# Patient Record
Sex: MALE | Race: WHITE | NOT HISPANIC OR LATINO | Employment: FULL TIME | URBAN - METROPOLITAN AREA
[De-identification: names, ages, dates, MRNs, and addresses within clinical notes are randomized per-mention and may not be internally consistent; named-entity substitution may affect disease eponyms.]

---

## 2017-03-15 ENCOUNTER — OFFICE VISIT (OUTPATIENT)
Dept: HEMATOLOGY/ONCOLOGY | Facility: CLINIC | Age: 37
End: 2017-03-15
Payer: COMMERCIAL

## 2017-03-15 ENCOUNTER — LAB VISIT (OUTPATIENT)
Dept: LAB | Facility: HOSPITAL | Age: 37
End: 2017-03-15
Attending: INTERNAL MEDICINE
Payer: COMMERCIAL

## 2017-03-15 VITALS
DIASTOLIC BLOOD PRESSURE: 75 MMHG | HEART RATE: 68 BPM | WEIGHT: 183.19 LBS | BODY MASS INDEX: 28.75 KG/M2 | SYSTOLIC BLOOD PRESSURE: 116 MMHG | HEIGHT: 67 IN

## 2017-03-15 DIAGNOSIS — D61.9 APLASTIC ANEMIA: ICD-10-CM

## 2017-03-15 DIAGNOSIS — Z86.2 HISTORY OF APLASTIC ANEMIA: Primary | ICD-10-CM

## 2017-03-15 LAB
BASOPHILS # BLD AUTO: 0 K/UL
BASOPHILS NFR BLD: 0 %
DIFFERENTIAL METHOD: ABNORMAL
EOSINOPHIL # BLD AUTO: 0.1 K/UL
EOSINOPHIL NFR BLD: 2.2 %
ERYTHROCYTE [DISTWIDTH] IN BLOOD BY AUTOMATED COUNT: 12.3 %
HCT VFR BLD AUTO: 44.3 %
HGB BLD-MCNC: 15.2 G/DL
LYMPHOCYTES # BLD AUTO: 1.9 K/UL
LYMPHOCYTES NFR BLD: 39.4 %
MCH RBC QN AUTO: 33.1 PG
MCHC RBC AUTO-ENTMCNC: 34.3 %
MCV RBC AUTO: 97 FL
MONOCYTES # BLD AUTO: 0.5 K/UL
MONOCYTES NFR BLD: 9.1 %
NEUTROPHILS # BLD AUTO: 2.4 K/UL
NEUTROPHILS NFR BLD: 49.1 %
PLATELET # BLD AUTO: 174 K/UL
PMV BLD AUTO: 9.7 FL
RBC # BLD AUTO: 4.59 M/UL
WBC # BLD AUTO: 4.92 K/UL

## 2017-03-15 PROCEDURE — 99213 OFFICE O/P EST LOW 20 MIN: CPT | Mod: S$GLB,,, | Performed by: INTERNAL MEDICINE

## 2017-03-15 PROCEDURE — 36415 COLL VENOUS BLD VENIPUNCTURE: CPT

## 2017-03-15 PROCEDURE — 1160F RVW MEDS BY RX/DR IN RCRD: CPT | Mod: S$GLB,,, | Performed by: INTERNAL MEDICINE

## 2017-03-15 PROCEDURE — 85025 COMPLETE CBC W/AUTO DIFF WBC: CPT

## 2017-03-15 PROCEDURE — 99999 PR PBB SHADOW E&M-EST. PATIENT-LVL III: CPT | Mod: PBBFAC,,, | Performed by: INTERNAL MEDICINE

## 2017-03-15 NOTE — MR AVS SNAPSHOT
[facility]  [address]  [facility] [address]  Phone: [phone]                  Mat REYES Tutu   3/15/2017 8:30 AM   Appointment    Description:  Male : 1980   Provider:  Castro Lyn Jr., MD   Department:  [facility]                To Do List           Future Appointments        Provider Department Dept Phone    3/15/2017 8:30 AM Castro Lyn Jr., MD [facility] [phone]      Goals (5 Years of Data)     None      [facility]     [facility] -  Assistance  Registered nurses in the [facility] Center provide clinical advisement, health education, appointment booking, and other advisory services.  Call for this free service at [phone].             Medications           Message regarding Medications     Verify the changes and/or additions to your medication regime listed below are the same as discussed with your clinician today.  If any of these changes or additions are incorrect, please notify your healthcare provider.             Verify that the below list of medications is an accurate representation of the medications you are currently taking.  If none reported, the list may be blank. If incorrect, please contact your healthcare provider. Carry this list with you in case of emergency.                Clinical Reference Information           Allergies as of 3/15/2017     No Known Allergies      Immunizations Administered on Date of Encounter - 3/15/2017     None      Language Assistance Services     ATTENTION: Language assistance services are available, free of charge. Please call [phone].      ATENCIÓN: Si habla español, tiene a pablo disposición servicios gratuitos de asistencia lingüística. Llame al [phone].     CHÚ Ý: N?u b?n nói Ti?ng Vi?t, có các d?ch v? h? tr? ngôn ng? mi?n phí dành cho b?n. G?i s? [phone].         [facility] complies with  applicable Federal civil rights laws and does not discriminate on the basis of race, color, national origin, age, disability, or sex.

## 2017-03-15 NOTE — PROGRESS NOTES
Mr. Cam is a 37-year-old man who was treated in 2000 for aplastic anemia.  He   had one course of horse antithymocyte globulin and was treated with   cyclosporine.  Cyclosporine was discontinued in November 2000.  He had an   excellent response to this treatment and has continued to do well without   evidence of disease recurrence or complications of the treatment.    In the last year, he has had no serious new medical problems.  He has had no   infections.  He has attempted to lose weight, but has not had much success in   doing so.  He is drinking only about two beers each week.  He gets some exercise   about three times a week.  He is now working full-time as an .    He had a sleep study, which is reported to show mild obstructive sleep apnea.    His children continue to do well.  His son, age 14, is academically at the head   of his class, but is undecided where he wishes to go to college.  His 8-year-old   daughter is also doing well.    ADDITIONAL PAST HISTORY, SYSTEM REVIEW, SOCIAL HISTORY AND FAMILY HISTORY:  Have   been reviewed and updated in the electronic record.    PHYSICAL EXAMINATION:  GENERAL APPEARANCE:  Well-developed, well-nourished male in no distress.  EYES:  No jaundice or pallor.  MOUTH AND THROAT:  Good dentition.  No mucosal lesions.  NECK:  No masses or bruits.  LYMPH NODES:  No enlarged cervical, axillary or inguinal nodes.  CHEST AND LUNGS:  Normal respiratory effort.  Clear to auscultation and   percussion.  HEART:  Regular rate and rhythm without murmur or gallop.  ABDOMEN:  Soft without masses or tenderness.  No hepatosplenomegaly.  EXTREMITIES:  No edema or cyanosis.  SKIN:  Several tattoos.  No suspicious lesions in the areas examined.  NEUROLOGIC:  Mental status is normal.  He is fully oriented.  Motor function is   normal.    LABORATORY STUDIES:  Blood counts include hemoglobin 15.2, WBC 4920 and   platelets 174,000.    IMPRESSION:  History of aplastic anemia, in  continued remission.    RECOMMENDATIONS:  Return visit in one year with CBC.      AWB/HN  dd: 03/15/2017 08:32:16 (CDT)  td: 03/15/2017 23:50:46 (CDT)  Doc ID   #[id number]  Job ID #[id number]    CC:

## 2017-10-09 ENCOUNTER — OFFICE VISIT (OUTPATIENT)
Dept: INTERNAL MEDICINE | Facility: CLINIC | Age: 37
End: 2017-10-09
Payer: COMMERCIAL

## 2017-10-09 VITALS
HEART RATE: 61 BPM | BODY MASS INDEX: 27.5 KG/M2 | DIASTOLIC BLOOD PRESSURE: 91 MMHG | RESPIRATION RATE: 16 BRPM | HEIGHT: 68 IN | SYSTOLIC BLOOD PRESSURE: 133 MMHG | TEMPERATURE: 99 F | WEIGHT: 181.44 LBS

## 2017-10-09 DIAGNOSIS — L03.032 PARONYCHIA OF FOURTH TOE, LEFT: Primary | ICD-10-CM

## 2017-10-09 DIAGNOSIS — L23.7 POISON IVY DERMATITIS: ICD-10-CM

## 2017-10-09 PROCEDURE — 99999 PR PBB SHADOW E&M-EST. PATIENT-LVL III: CPT | Mod: PBBFAC,,, | Performed by: INTERNAL MEDICINE

## 2017-10-09 PROCEDURE — 99214 OFFICE O/P EST MOD 30 MIN: CPT | Mod: S$GLB,,, | Performed by: INTERNAL MEDICINE

## 2017-10-09 RX ORDER — CEPHALEXIN 500 MG/1
500 TABLET ORAL 3 TIMES DAILY
Qty: 30 TABLET | Refills: 0 | Status: SHIPPED | OUTPATIENT
Start: 2017-10-09 | End: 2017-11-22 | Stop reason: SDUPTHER

## 2017-10-09 NOTE — PROGRESS NOTES
Subjective:       Patient ID: Mat Cam is a 37 y.o. male.    Chief Complaint: infected toe  HISTORY OF PRESENT ILLNESS:  A 37-year-old white male patient comes in with the   onset of first toe pain starting 3 days prior to this visit.  He has had no   trauma that he is aware of.  He does wear boots, he sweats a lot in his boots   and sprays his feet with a disinfectant to keep them dry.  He has had no fever   or chills.  He does have pain in the toe and has had a dressing over.  He   trimmed his nails the day after this started.    PHYSICAL EXAMINATION:  VITAL SIGNS:  Normal.  EXTREMITIES:  The left first toe, he has a paronychial infection.  He has a bit   of maceration of the skin around that.  There is no free pus.  The nail looks   alright.    IMPRESSION:  Paronychia, left first toe.    I told him to soak in soapy or Epson salts that is warm to hot and placed him on   Keflex 500 t.i.d. for 10 days and if this is getting worse, he is to see either   Podiatry or Orthopedics to have it drained.      JCHRISTIANO/KEITH  dd: 10/09/2017 12:47:47 (CDT)  td: 10/10/2017 04:02:21 (CDT)  Doc ID   #[id number]  Job ID #[id number]    CC:     [facility] [id number]  HPI  Review of Systems   Constitutional: Negative for activity change, appetite change, fatigue and unexpected weight change.   HENT: Negative for dental problem, hearing loss, mouth sores, postnasal drip and sinus pressure.    Eyes: Negative for discharge and visual disturbance.   Respiratory: Negative for cough and shortness of breath.    Cardiovascular: Negative for chest pain and palpitations.   Gastrointestinal: Negative for abdominal pain, blood in stool, constipation, diarrhea and nausea.   Genitourinary: Negative for dysuria, hematuria and testicular pain.   Musculoskeletal: Positive for arthralgias and neck pain. Negative for back pain and joint swelling.   Skin: Positive for color change and wound. Negative for rash.   Neurological: Negative for weakness and headaches.    Psychiatric/Behavioral: Negative for agitation and sleep disturbance.       Objective:      Physical Exam   Constitutional: He is oriented to person, place, and time. He appears well-developed and well-nourished.   HENT:   Head: Normocephalic.   Right Ear: External ear normal.   Left Ear: External ear normal.   Mouth/Throat: Oropharynx is clear and moist.   Eyes: EOM are normal. Pupils are equal, round, and reactive to light. Right eye exhibits no discharge.   Neck: Normal range of motion. No JVD present. No tracheal deviation present. No thyromegaly present.   Cardiovascular: Normal rate, regular rhythm, normal heart sounds and intact distal pulses.  Exam reveals no gallop.    No murmur heard.  Pulmonary/Chest: Effort normal and breath sounds normal. He has no wheezes. He has no rales.   Abdominal: Soft. Bowel sounds are normal. He exhibits no mass. There is no tenderness.   Genitourinary: Prostate normal and penis normal. Rectal exam shows guaiac negative stool.   Musculoskeletal: Normal range of motion. He exhibits tenderness. He exhibits no edema.   Lymphadenopathy:     He has no cervical adenopathy.   Neurological: He is oriented to person, place, and time. He displays normal reflexes. No cranial nerve deficit. Coordination normal.   Skin: Skin is warm. No rash noted. There is erythema. No pallor.   Psychiatric: He has a normal mood and affect.       Assessment:       1. Paronychia of fourth toe, left    2. Poison ivy dermatitis        Plan:

## 2017-10-12 ENCOUNTER — HOSPITAL ENCOUNTER (OUTPATIENT)
Dept: RADIOLOGY | Facility: HOSPITAL | Age: 37
Discharge: HOME | End: 2017-10-12
Attending: FAMILY MEDICINE
Payer: COMMERCIAL

## 2017-10-12 ENCOUNTER — OFFICE VISIT (OUTPATIENT)
Dept: FAMILY MEDICINE | Facility: CLINIC | Age: 37
End: 2017-10-12
Payer: COMMERCIAL

## 2017-10-12 VITALS
SYSTOLIC BLOOD PRESSURE: 126 MMHG | TEMPERATURE: 98 F | DIASTOLIC BLOOD PRESSURE: 79 MMHG | BODY MASS INDEX: 28.68 KG/M2 | WEIGHT: 182.75 LBS | HEART RATE: 79 BPM | HEIGHT: 67 IN

## 2017-10-12 DIAGNOSIS — M54.2 NECK PAIN: ICD-10-CM

## 2017-10-12 DIAGNOSIS — M54.2 NECK PAIN: Primary | ICD-10-CM

## 2017-10-12 DIAGNOSIS — M25.519 SHOULDER PAIN, UNSPECIFIED CHRONICITY, UNSPECIFIED LATERALITY: ICD-10-CM

## 2017-10-12 PROCEDURE — 96374 THER/PROPH/DIAG INJ IV PUSH: CPT | Mod: S$GLB,,, | Performed by: FAMILY MEDICINE

## 2017-10-12 PROCEDURE — 72040 X-RAY EXAM NECK SPINE 2-3 VW: CPT | Mod: TC,PO

## 2017-10-12 PROCEDURE — 72040 X-RAY EXAM NECK SPINE 2-3 VW: CPT | Mod: 26,,, | Performed by: RADIOLOGY

## 2017-10-12 PROCEDURE — 99999 PR PBB SHADOW E&M-EST. PATIENT-LVL IV: CPT | Mod: PBBFAC,,, | Performed by: FAMILY MEDICINE

## 2017-10-12 PROCEDURE — 73030 X-RAY EXAM OF SHOULDER: CPT | Mod: 26,RT,, | Performed by: RADIOLOGY

## 2017-10-12 PROCEDURE — 96372 THER/PROPH/DIAG INJ SC/IM: CPT | Mod: S$GLB,,, | Performed by: FAMILY MEDICINE

## 2017-10-12 PROCEDURE — 99204 OFFICE O/P NEW MOD 45 MIN: CPT | Mod: 25,S$GLB,, | Performed by: FAMILY MEDICINE

## 2017-10-12 PROCEDURE — 73030 X-RAY EXAM OF SHOULDER: CPT | Mod: TC,PO,RT

## 2017-10-12 RX ORDER — CEPHALEXIN 500 MG/1
CAPSULE ORAL
Refills: 0 | COMMUNITY
Start: 2017-10-09 | End: 2017-10-12

## 2017-10-12 RX ORDER — DICLOFENAC SODIUM 50 MG/1
50 TABLET, DELAYED RELEASE ORAL 2 TIMES DAILY
Qty: 40 TABLET | Refills: 1 | Status: SHIPPED | OUTPATIENT
Start: 2017-10-12 | End: 2018-10-10

## 2017-10-12 RX ORDER — CYCLOBENZAPRINE HCL 10 MG
10 TABLET ORAL NIGHTLY PRN
Qty: 30 TABLET | Refills: 1 | Status: SHIPPED | OUTPATIENT
Start: 2017-10-12 | End: 2017-10-22

## 2017-10-12 RX ORDER — KETOROLAC TROMETHAMINE 30 MG/ML
60 INJECTION, SOLUTION INTRAMUSCULAR; INTRAVENOUS
Status: COMPLETED | OUTPATIENT
Start: 2017-10-12 | End: 2017-10-12

## 2017-10-12 RX ADMIN — KETOROLAC TROMETHAMINE 60 MG: 30 INJECTION, SOLUTION INTRAMUSCULAR; INTRAVENOUS at 02:10

## 2017-10-12 NOTE — PATIENT INSTRUCTIONS
Understanding Cervical Strain    There are 7 bones (vertebrae) in the neck that are part of the spine. These are called the cervical spine. Cervical strain is a medical term for neck pain. The neck has several layers of muscles. These are connected with tendons to the cervical spine and other bones. Neck pain is often the result of injury to these muscles and tendons.  Causes of cervical strain  Different types of stress on the neck can damage muscles and tendons (soft tissues) and cause cervical strain. Cervical tissues can be damaged by:  · The neck being forced past its normal range of motion, such as in a car accident or sports injury  · Constant, low-level stress, such as from poor posture or a poorly set-up workspace  Symptoms of cervical strain  These may include:  · Neck pain or stiffness  · Pain in the shoulders or upper back  · Muscle spasms  · Headache, often starting at the base of the neck  · Irritability, difficulty concentrating, or sleeplessness  Treatment for cervical strain  This problem often gets better on its own. Treatments aim to reduce pain and inflammation and increase the range of motion of the neck. Possible treatments include:  · Over-the-counter or prescription pain medicine. These help relieve pain and inflammation.  · Stretching exercises to decrease neck stiffness.  · Massage to decrease neck stiffness.  · Cold or heat pack. These help reduce pain and swelling.  Call 911  Call emergency services right away if you have any of these:  · Face drooping or numbness  · Numbness or weakness, especially in the arms or on one side  · Slurred speech or difficulty speaking  · Blurred vision   When to call your healthcare provider  Call your healthcare provider right away if you have any of these:  · Fever of 100.4°F (38°C) or higher, or as directed  · Pain or stiffness that gets worse  · Symptoms that dont get better, or get worse  · Numbness, tingling, weakness or shooting pains into the  arms or legs  · New symptoms  Date Last Reviewed: 3/10/2016  © 8988-6331 [employer]. [address] [place] [address]. All rights reserved. This information is not intended as a substitute for professional medical care. Always follow your healthcare professional's instructions.        Exercises: Neck Isometrics  To start, sit in a chair with your feet flat on the floor. Your weight should be slightly forward so that youre balanced evenly on your buttocks. Relax your shoulders and keep your head level. Using a chair with arms may help you keep your balance.       1. Press your palm against your forehead. Resist with your neck muscles. Hold for 10 seconds. Relax. Repeat 5 times.  2. Do the exercise again, pressing on the side of your head. Repeat 5 times. Switch sides.  3. Do the exercise again, pressing on the back of your head. Repeat 5 times.  For your safety, check with your healthcare provider before starting an exercise program.   Date Last Reviewed: 8/16/2015  © 3280-1956 [employer]. [address] [place] [address]. All rights reserved. This information is not intended as a substitute for professional medical care. Always follow your healthcare professional's instructions.        Exercises at Your Workstation: Eyes, Neck, and Head  Tired eyes? Stiff neck? A few easy moves can help prevent these kinds of problems. Take a few minutes during your day to do these exercises--right at your desk. They'll loosen up your muscles, keep you more alert, and make a big difference in how you work and feel.  Breathe deeply as you do your exercises. Inhale through your nose, and exhale through your mouth.        Eye cup Head tilt   For your eyes  Eye cup  · Lean forward with your elbows on your desk.  · Cup your hands and place them lightly over your closed eyes. Hold for a minute, while breathing deeply in and out.  · Slowly uncover and open your eyes. Repeat 2 times.  Eye  roll  · Close your eyes. Slowly roll your eyeballs clockwise all the way around. Repeat 3 times.  · Now slowly roll them all the way around counterclockwise. Repeat 3 times.  Eye rest  · Every 20 minutes, look away from the computer screen. Focus on an object at least 20 feet away. Stay focused on this object for a full 20 seconds.  For your neck and head  Warm-up  · Drop your head gently to your chest. While breathing in, slowly roll your head up to your left shoulder. While breathing out, slowly roll your head back to center. Repeat to the right.  · Repeat 3 times on each side.  Head tilt  · Sit up straight. Tuck in your chin.  · Slowly tip your head to the left. Return to the center. Then, tip your head to the right.  · Repeat 3 times on each side.  Head turn  · Sit up straight.  · Slowly turn your head and look over your left shoulder. Hold for a few seconds. Go back to the center, then repeat to your right.  · Repeat 3 times on each side.  If you feel pain while doing these stretching exercises, please stop and consult your healthcare provider.   Date Last Reviewed: 12/28/2015  © 7762-2055 [employer]. [address] [place] [address], [place] [address]. All rights reserved. This information is not intended as a substitute for professional medical care. Always follow your healthcare professional's instructions.

## 2017-10-12 NOTE — PROGRESS NOTES
Two patient identifiers verified.  Allergies reviewed.  Ketorolac injection 60 mg given to RT upper outer quad gluteus, patient tolerated well.  Advise patient to wait 15min after shot is given for any adverse reaction.

## 2017-10-13 NOTE — PROGRESS NOTES
Subjective:       Patient ID: Mat Cam is a 37 y.o. male.    Chief Complaint: Neck Pain  over the last week pt woke up with pain in  The right side of the neck and right shoulder  That has just been getting gradually worse over the week.  HPI see above  Review of Systems   Constitutional: Negative.    HENT: Negative.    Eyes: Negative.    Respiratory: Negative.    Cardiovascular: Negative.    Endocrine: Negative.    Genitourinary: Negative.    Musculoskeletal: Positive for arthralgias, back pain, myalgias, neck pain and neck stiffness.   Skin: Negative.    Allergic/Immunologic: Negative.    Neurological: Negative.    Hematological: Negative.    Psychiatric/Behavioral: Negative.        Objective:      Physical Exam   Constitutional: He appears well-developed and well-nourished.   HENT:   Head: Normocephalic and atraumatic.   Eyes: Conjunctivae and EOM are normal. Pupils are equal, round, and reactive to light.   Neck: No JVD present. No thyromegaly present.   Pulmonary/Chest: Effort normal and breath sounds normal.   Musculoskeletal:        Right shoulder: He exhibits decreased range of motion and crepitus.        Left shoulder: Normal.        Cervical back: He exhibits decreased range of motion, tenderness, pain and spasm.   Skin: He is not diaphoretic.   Nursing note and vitals reviewed.      Assessment:       1. Neck pain    2. Shoulder pain, unspecified chronicity, unspecified laterality        Plan:       Normal report X-ray Shoulder 2 or More Views Right        Spasm evident X-Ray Cervical Spine AP And Lateral        See med card dated 10-12-17   ketorolac injection 60 mg (Completed) 60 mg, Clinic/HOD 1 time        diclofenac (VOLTAREN) 50 MG EC tablet 50 mg, 2 times daily        cyclobenzaprine (FLEXERIL) 10 MG tablet 10 mg, Nightly PRN

## 2017-11-22 ENCOUNTER — OFFICE VISIT (OUTPATIENT)
Dept: FAMILY MEDICINE | Facility: CLINIC | Age: 37
End: 2017-11-22
Payer: COMMERCIAL

## 2017-11-22 VITALS
SYSTOLIC BLOOD PRESSURE: 120 MMHG | WEIGHT: 177.69 LBS | OXYGEN SATURATION: 96 % | HEART RATE: 80 BPM | HEIGHT: 67 IN | DIASTOLIC BLOOD PRESSURE: 78 MMHG | TEMPERATURE: 98 F | BODY MASS INDEX: 27.89 KG/M2

## 2017-11-22 DIAGNOSIS — B35.3 ATHLETE'S FOOT ON LEFT: Primary | ICD-10-CM

## 2017-11-22 PROCEDURE — 99999 PR PBB SHADOW E&M-EST. PATIENT-LVL III: CPT | Mod: PBBFAC,,, | Performed by: NURSE PRACTITIONER

## 2017-11-22 PROCEDURE — 99213 OFFICE O/P EST LOW 20 MIN: CPT | Mod: S$GLB,,, | Performed by: NURSE PRACTITIONER

## 2017-11-22 RX ORDER — CLOTRIMAZOLE AND BETAMETHASONE DIPROPIONATE 10; .64 MG/G; MG/G
CREAM TOPICAL 2 TIMES DAILY
Qty: 45 G | Refills: 1 | Status: SHIPPED | OUTPATIENT
Start: 2017-11-22 | End: 2019-02-27

## 2017-11-22 RX ORDER — CEPHALEXIN 500 MG/1
500 TABLET ORAL 3 TIMES DAILY
Qty: 30 TABLET | Refills: 0 | Status: SHIPPED | OUTPATIENT
Start: 2017-11-22 | End: 2017-11-22

## 2017-11-22 NOTE — PROGRESS NOTES
"Subjective:       Patient ID: Mat Cam is a 37 y.o. male.    Chief Complaint: Toe Pain (pt states toe pain and itching left foot/toe)    Toe Pain    There was no injury mechanism. The pain is present in the left toes. Pertinent negatives include no inability to bear weight, numbness or tingling. Exacerbated by: wears boots about 15 hours a day.       Past Medical History:   Diagnosis Date    Anemia 2001    aplastic anemia    Blood transfusion     Calcium oxalate renal stones     Diverticulitis 2011       Social History     Social History    Marital status:      Spouse name: N/A    Number of children: N/A    Years of education: N/A     Occupational History    Not on file.     Social History Main Topics    Smoking status: Never Smoker    Smokeless tobacco: Never Used    Alcohol use No    Drug use: No    Sexual activity: Yes     Partners: Female     Other Topics Concern    Not on file     Social History Narrative    No narrative on file       Past Surgical History:   Procedure Laterality Date    procedure on eye muscles as child         Review of Systems   Skin: Positive for color change.   Neurological: Negative for tingling and numbness.       Objective:   /78 (BP Location: Left arm, Patient Position: Sitting, BP Method: Large (Manual))   Pulse 80   Temp 98.2 °F (36.8 °C) (Oral)   Ht 5' 7" (1.702 m)   Wt 80.6 kg (177 lb 11.1 oz)   SpO2 96%   BMI 27.83 kg/m²      Physical Exam   Feet:   Left Foot:   Skin Integrity: Positive for skin breakdown (white scaly patch noted to inner left great toe with peeling skin).       Assessment:       1. Athlete's foot on left        Plan:       Mat REYES was seen today for toe pain.    Diagnoses and all orders for this visit:    Athlete's foot on left  -     clotrimazole-betamethasone 1-0.05% (LOTRISONE) cream; Apply topically 2 (two) times daily.    Return if symptoms worsen or fail to improve.    "

## 2017-11-22 NOTE — PATIENT INSTRUCTIONS
Athletes Foot    Athletes foot (tinea pedis) is caused by a fungal infection in the skin. It affects the skin between the toes, causing cracks in the skin called fissures. It can also affect the bottom of the foot where it causes dry white scales and peeling of the skin. This infection is more likely to occur when the foot is in hot, sweaty socks and shoes for long periods of time. You may feel itching and burning between your toes. This infection is treated with skin creams or medicine taken by mouth.  Home care  The following are general care guidelines:  · It is important to keep the feet dry. Use absorbent cotton socks and change them if they become sweaty. Or wear an open-toe shoe or sandal. Wash the feet at least once a day with soap and water.  · Apply the antifungal cream as prescribed. Some antifungal creams are available without a prescription.  · It may take a week before the rash starts to improve. It can take about 3 to 4 weeks to completely clear. Continue the medicine until the rash is all gone.  · Use over-the-counter antifungal powders or sprays on your feet after exposure to high-risk environments, such as public showers, gyms, and locker rooms. This can help prevent future infections. Wearing appropriate shoes in these situations can help.  Prevention  The following tips may help prevent athletes foot:  · Don't share shoes or socks with someone who has athlete's foot.  · Don't walk barefoot in places where a fungal infection can spread quickly such as locker rooms, showers, and swimming pools.  · Change socks regularly.  · Alternate shoes to assist in drying.  Follow-up care  Follow up with your healthcare provider as recommended if the rash does not improve after 10 days of treatment, or if the rash continues to spread.  When to seek medical care  Get medical attention right away if any of the following occur:  · Fever of 100.4°F (38°C) or higher, or as directed  · Increasing redness or  swelling of the foot  · Infection comes back soon after treatment  · Pus draining from cracks in the skin  Date Last Reviewed: 8/1/2016 © 2000-2017 [employer]. [address] [place] [address], [place] [address]. All rights reserved. This information is not intended as a substitute for professional medical care. Always follow your healthcare professional's instructions.        Fungal Skin Infection (Tinea) (Child)  A fungal infection happens when too much fungus grows on or in the body. Fungus normally lives on the skin in small amounts and does not cause harm. But when too much grows on the skin, it causes an infection. This is also known as tinea. Fungal skin infections are common in children and usually not serious.  The infection often starts as a small red area the size of a pea. The skin may turn dry and flaky. The area may itch. As the fungus grows, it spreads out in a red [place]. Because of how it looks, fungal skin infection is often called ringworm, but it is not caused by a worm. Fungal skin infections can occur on many parts of the body. They can grow on the head, chest, arms, or legs. They can occur on the buttocks. On the feet, fungal infection is known as athletes foot. It causes itchy, sometimes painful sores between the toes and on the bottom or sides of the feet.  In babies and children, a fungal skin infection is often caused by contact with a person or animal that is infected. A child who has been on antibiotics can get the infection more easily. A child with a weakened immune system can also get fungal infections more easily. Children who have diabetes or are obese also are more likely to get a fungal infection.   In most cases, treatment is done with antifungal cream or ointment. If the infection is on your childs scalp, oral medicine may be given. In some cases, a tiny piece of the skin may be taken. This is so it can be tested in a lab.  Home care  Follow all instructions when using  antifungal cream or ointment on your child. For diaper areas, the healthcare provider may advise using cornstarch powder to keep the skin dry or petroleum jelly to provide a barrier. Dont use talcum powder. It can harm the lungs.  General care  · Expose the affected skin to the air so that it dries completely. Don't use a hair dryer on the skin. Carefully dry the feet and between the toes after bathing.  · Dress your child in loose-fitting cotton clothing.  · Make sure your child does not scratch the affected area. This can delay healing and may spread the infection. It can also cause a bacterial infection. You may need to use scratch mittens that cover your childs hands.  · Keep your childs skin clean, but dont wash the skin too much. This can irritate the skin.  For children in diapers:  · Keep your childs skin dry by changing wet or soiled diapers right away.  · Use cold cream on a cotton ball to wipe urine off the skin. Use warm water and a mild soap to clean stool off the skin.  · Use mineral oil on a cotton ball to gently remove soiled ointment. Keep clean ointment on the skin. Apply more ointment after each diaper change.  · Use superabsorbent disposable diapers to help keep your child's skin dry. If you use cloth diapers, use overwraps that breathe. Don't use rubber pants over the diaper.  Follow-up care  Follow up with your childs healthcare provider, or as advised.  Special note to parents  Wash your hands well with soap and warm water before and after caring for your child. This is to help avoid spreading the infection.  When to seek medical advice  Call your child's healthcare provider right away if any of these occur:  · Fever of 100.4°F (38°C) or higher, or as directed by your child's healthcare provider  · Redness or swelling that gets worse  · Pain that gets worse  · Foul-smelling fluid leaking from the skin  Date Last Reviewed: 1/1/2017  © 2353-0608 [employer]. [address] [place] [address]  [place] [address]. All rights reserved. This information is not intended as a substitute for professional medical care. Always follow your healthcare professional's instructions.        Clotrimazole skin cream, lotion, or solution  What is this medicine?  CLOTRIMAZOLE (kloe TRIM a zole) is an antifungal medicine. It is used to treat certain kinds of fungal or yeast infections of the skin.  How should I use this medicine?  This medicine is for external use only. Do not take by mouth. Follow the directions on the prescription label. Wash your hands before and after use. If treating a hand or nail infection, wash hands before use only. Apply a thin layer to the affected area and a small amount to the surrounding area. Rub in gently. Do not get this medicine in your eyes. If you do, rinse out with plenty of cool tap water. Use this medicine at regular intervals. Do not use more often than directed. Finish the full course prescribed by your doctor or health care professional even if you think you are better. Do not stop using except on your doctor's advice.  Talk to your pediatrician regarding the use of this medicine in children. While this drug has been used in young children for selected conditions, precautions do apply.  What side effects may I notice from receiving this medicine?  Side effects that usually do not require medical attention (report to your doctor or health care professional if they continue or are bothersome):  · allergic reactions like skin rash, itching or hives, swelling of the face, lips, or tongue  · skin irritation, burning  What may interact with this medicine?  · amphotericin b  · topical products that have nystatin  What if I miss a dose?  If you miss a dose, use it as soon as you can. If it is almost time for your next dose, use only that dose. Do not use double or extra doses.  Where should I keep my medicine?  Keep out of the reach of children.  Store at room temperature between  2 to 30 degrees C (36 to 86 degrees F). Do not freeze. Throw away any unused medicine after the expiration date.  What should I tell my health care provider before I take this medicine?  They need to know if you have any of these conditions:  · an unusual or allergic reaction to clotrimazole, other antifungals or medicines, foods, dyes or preservatives  · pregnant or trying to get pregnant  · breast-feeding  What should I watch for while using this medicine?  Tell your doctor or health care professional if your symptoms do not start to improve after 7 days. Do not self-medicate for more than one week.  If you are using this medicine for 'jock itch' be sure to dry the groin completely after bathing. Do not wear underwear that is tight-fitting or made from synthetic fibers like huber or nylon. Wear loose-fitting, cotton underwear.  If you are using this medicine for athlete's foot be sure to dry your feet carefully after bathing, especially between the toes. Do not wear socks made from wool or synthetic materials like huber or nylon. Wear clean cotton socks and change them at least once a day, change them more if your feet sweat a lot. Also, try to wear sandals or shoes that are well-ventilated.  NOTE:This sheet is a summary. It may not cover all possible information. If you have questions about this medicine, talk to your doctor, pharmacist, or health care provider. Copyright© 2017 [employer]        Paronychia of the Finger or Toe  Paronychia is an infection near a fingernail or toenail. It usually occurs when an opening in the cuticle or an ingrown toenail lets bacteria under the skin.  The infection will need to be drained if pus is present. If the infection has been caught early, you may need only antibiotic treatment. Healing will take about 1 to 2 weeks.  Home care  Follow these guidelines when caring for yourself at home:  · Clean and soak the toe or finger. Do this 2 times a day for the first 3 days. To do  so:  ¨ Soak your foot or hand in a tub of warm water for 5 minutes. Or hold your toe or finger under a faucet of warm running water for 5 minutes.  ¨ Clean any crust away with soap and water using a cotton swab.  ¨ Put antibiotic ointment on the infected area.  · Change the dressing daily or any time it gets dirty.  · If you were given antibiotics, take them as directed until they are all gone.  · If your infection is on a toe, wear comfortable shoes with a lot of toe room. You can also wear open-toed sandals while your toe heals.  · You may use over-the-counter medicine (acetaminophen or ibuprofen to help with pain, unless another medicine was prescribed. If you have chronic liver or kidney disease, talk with your healthcare provider before using these medicines. Also talk with your provider if you've had a stomach ulcer or GI (gastrointestinal) bleeding.  Prevention  The following can prevent paronychia:  · Avoid cutting or playing with your cuticles at home.  · Don't bite your nails.  · Don't suck on your thumbs or fingers.  Follow-up care  Follow up with your healthcare provider, or as advised.  When to seek medical advice  Call your healthcare provider right away if any of these occur:  · Redness, pain, or swelling of the finger or toe gets worse  · Red streaks in the skin leading away from the wound  · Pus or fluid draining from the nail area  · Fever of 100.4ºF (38ºC) or higher, or as directed by your provider  Date Last Reviewed: 8/1/2016  © 1715-6851 [employer]. [address] [place] [address], [place] [address]. All rights reserved. This information is not intended as a substitute for professional medical care. Always follow your healthcare professional's instructions.

## 2018-03-15 ENCOUNTER — LAB VISIT (OUTPATIENT)
Dept: LAB | Facility: HOSPITAL | Age: 38
End: 2018-03-15
Attending: INTERNAL MEDICINE
Payer: COMMERCIAL

## 2018-03-15 ENCOUNTER — OFFICE VISIT (OUTPATIENT)
Dept: HEMATOLOGY/ONCOLOGY | Facility: CLINIC | Age: 38
End: 2018-03-15
Payer: COMMERCIAL

## 2018-03-15 VITALS
WEIGHT: 179 LBS | BODY MASS INDEX: 28.09 KG/M2 | SYSTOLIC BLOOD PRESSURE: 118 MMHG | DIASTOLIC BLOOD PRESSURE: 75 MMHG | HEIGHT: 67 IN | HEART RATE: 72 BPM

## 2018-03-15 DIAGNOSIS — Z86.2 HISTORY OF APLASTIC ANEMIA: Primary | ICD-10-CM

## 2018-03-15 DIAGNOSIS — D61.9 APLASTIC ANEMIA: ICD-10-CM

## 2018-03-15 LAB
BASOPHILS # BLD AUTO: 0.01 K/UL
BASOPHILS NFR BLD: 0.2 %
DIFFERENTIAL METHOD: ABNORMAL
EOSINOPHIL # BLD AUTO: 0.2 K/UL
EOSINOPHIL NFR BLD: 2.8 %
ERYTHROCYTE [DISTWIDTH] IN BLOOD BY AUTOMATED COUNT: 11.5 %
HCT VFR BLD AUTO: 41.8 %
HGB BLD-MCNC: 14.5 G/DL
IMM GRANULOCYTES # BLD AUTO: 0.01 K/UL
IMM GRANULOCYTES NFR BLD AUTO: 0.2 %
LYMPHOCYTES # BLD AUTO: 2.2 K/UL
LYMPHOCYTES NFR BLD: 39.4 %
MCH RBC QN AUTO: 32.9 PG
MCHC RBC AUTO-ENTMCNC: 34.7 G/DL
MCV RBC AUTO: 95 FL
MONOCYTES # BLD AUTO: 0.6 K/UL
MONOCYTES NFR BLD: 10.4 %
NEUTROPHILS # BLD AUTO: 2.7 K/UL
NEUTROPHILS NFR BLD: 47 %
NRBC BLD-RTO: 0 /100 WBC
PLATELET # BLD AUTO: 224 K/UL
PMV BLD AUTO: 9.2 FL
RBC # BLD AUTO: 4.41 M/UL
WBC # BLD AUTO: 5.66 K/UL

## 2018-03-15 PROCEDURE — 99213 OFFICE O/P EST LOW 20 MIN: CPT | Mod: S$GLB,,, | Performed by: INTERNAL MEDICINE

## 2018-03-15 PROCEDURE — 99999 PR PBB SHADOW E&M-EST. PATIENT-LVL III: CPT | Mod: PBBFAC,,, | Performed by: INTERNAL MEDICINE

## 2018-03-15 PROCEDURE — 85025 COMPLETE CBC W/AUTO DIFF WBC: CPT

## 2018-03-15 PROCEDURE — 36415 COLL VENOUS BLD VENIPUNCTURE: CPT

## 2018-03-15 NOTE — PROGRESS NOTES
HISTORY OF PRESENT ILLNESS:  Mr. Cam is a 38-year-old man who was treated in   2000 for aplastic anemia.  He had one course of horse antithymocyte globulin and   received cyclosporine.  Cyclosporine was discontinued in November 2000.  He had   an excellent response to this treatment and has continued to do well since that   time without any evidence of disease recurrence or complications of the   treatment.    In the past year, he has had no new medical problems.  He had one or two very   mild upper respiratory infections or congestion.  He has had some success in   getting more exercise and losing a few pounds of weight.  He drinks very little   alcohol.    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  In the past, he often worked offshore.  He has mild   obstructive sleep apnea.    His children continue to do well.  His son, age 15, has an extremely good   academic performance and is considering studying architectural engineering.  He   also has a 9-year-old daughter.    ADDITIONAL PAST HISTORY, SYSTEM REVIEW, SOCIAL HISTORY AND FAMILY HISTORY:  Have   been reviewed and updated in the electronic record.    PHYSICAL EXAMINATION:  GENERAL APPEARANCE:  Well-developed, well-nourished man in no distress.  EYES:  No jaundice or pallor.  MOUTH AND THROAT:  Good dentition.  No mucosal lesions.  NECK:  No masses or bruits.  No thyroid abnormalities.  LYMPH NODES:  No enlarged cervical, axillary or inguinal nodes.  CHEST AND LUNGS:  Normal respiratory effort.  Clear to auscultation and   percussion.  HEART:  Regular rate and rhythm without murmur or gallop.  ABDOMEN:  Soft without masses or tenderness.  No hepatosplenomegaly.  EXTREMITIES:  No edema or cyanosis.  PERIPHERAL VASCULATURE:  Good pulses in the feet and ankles.  SKIN:  No suspicious lesions in the areas examined.  Several tattoos.  NEUROLOGIC:  Motor function is normal.  Mental status is normal.   He is fully   oriented.    LABORATORY STUDIES:  Blood counts today include hemoglobin 14.5, platelets   224,000 and WBC 5660 with a normal differential.    IMPRESSION:  History of aplastic anemia, in continued remission.    RECOMMENDATIONS:  Return visit in one year with CBC.      ESDRAS/KEITH  dd: 03/15/2018 08:23:29 (CDT)  td: 03/15/2018 21:10:21 (CDT)  Doc ID   #[id number]  Job ID #[id number]    CC:

## 2018-10-10 ENCOUNTER — OFFICE VISIT (OUTPATIENT)
Dept: OTOLARYNGOLOGY | Facility: CLINIC | Age: 38
End: 2018-10-10
Payer: COMMERCIAL

## 2018-10-10 VITALS
SYSTOLIC BLOOD PRESSURE: 126 MMHG | HEART RATE: 66 BPM | WEIGHT: 183 LBS | BODY MASS INDEX: 28.72 KG/M2 | HEIGHT: 67 IN | DIASTOLIC BLOOD PRESSURE: 88 MMHG

## 2018-10-10 DIAGNOSIS — J34.3 NASAL TURBINATE HYPERTROPHY: ICD-10-CM

## 2018-10-10 DIAGNOSIS — G47.30 SLEEP-DISORDERED BREATHING: ICD-10-CM

## 2018-10-10 DIAGNOSIS — J34.2 DEVIATED NASAL SEPTUM: Primary | ICD-10-CM

## 2018-10-10 PROCEDURE — 99999 PR PBB SHADOW E&M-EST. PATIENT-LVL III: CPT | Mod: PBBFAC,,, | Performed by: OTOLARYNGOLOGY

## 2018-10-10 PROCEDURE — 99214 OFFICE O/P EST MOD 30 MIN: CPT | Mod: S$GLB,,, | Performed by: OTOLARYNGOLOGY

## 2018-10-10 NOTE — PROGRESS NOTES
Subjective:      Mat Cam is a 38 y.o. male who was self-referred for nasal obstruction.    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  He notes associated hyposmia.  He has has relief with Afrin but has not found that daily Flonase or saline helps.  He denies rhinorrhea, postnasal drip, facial pressure, aural fullness or notable sinus infections.  He snores loudly and is a mouth breather but denies apneas or nonrejuvenating sleep.    Current sinonasal medications include Flonase and saline.  He does not regularly use nasal decongestant sprays though previously was hooked on Afrin.    He does not recall previously having allergy testing.    He denies a history of asthma .    He relates a history of reflux symptoms which is currently managed with Tums.    He denies have a diagnosis of obstructive sleep apnea and had a negative sleep study in the past     He has not had sinonasal surgery.    He does not recall a prior history of nasal trauma.    SNOT-22 score = 17, NOSE score = 55%, ETDQ-7 score = 1.3    Global QOL = 75%    Days missed = Less than 5      Past Medical History  He has a past medical history of Anemia, Blood transfusion, Calcium oxalate renal stones, and Diverticulitis.    Past Surgical History  He has a past surgical history that includes procedure on eye muscles as child.    Family History  His family history includes Stroke in his father.    Social History  He reports that  has never smoked. he has never used smokeless tobacco. He reports that he does not drink alcohol or use drugs.    Allergies  He has No Known Allergies.    Medications   He has a current medication list which includes the following prescription(s): clotrimazole-betamethasone 1-0.05%.    Review of Systems  Review of Systems   Constitutional: Negative for fatigue, fever and unexpected weight change.   HENT: Positive for sinus pressure. Negative for congestion, dental  "problem, ear discharge, ear pain, facial swelling, hearing loss, hoarse voice, nosebleeds, postnasal drip, rhinorrhea, sore throat, tinnitus, trouble swallowing and voice change.    Eyes: Negative for photophobia, discharge, itching and visual disturbance.   Respiratory: Negative for apnea, cough, shortness of breath and wheezing.    Cardiovascular: Negative for chest pain and palpitations.   Gastrointestinal: Negative for abdominal pain, nausea and vomiting.   Endocrine: Negative for cold intolerance and heat intolerance.   Genitourinary: Negative for difficulty urinating.   Musculoskeletal: Negative for arthralgias, back pain, myalgias and neck pain.   Skin: Negative for rash.   Allergic/Immunologic: Negative for environmental allergies and food allergies.   Neurological: Negative for dizziness, seizures, syncope, weakness and headaches.   Hematological: Negative for adenopathy. Does not bruise/bleed easily.   Psychiatric/Behavioral: Negative for decreased concentration, dysphoric mood and sleep disturbance. The patient is not nervous/anxious.           Objective:     /88   Pulse 66   Ht 5' 7" (1.702 m)   Wt 83 kg (182 lb 15.7 oz)   BMI 28.66 kg/m²        Constitutional:   He appears well-developed. He is cooperative. Normal speech.  No hoarse voice.      Head:  Normocephalic. Salivary glands normal.  Facial strength is normal.      Ears:    Right Ear: No drainage or tenderness. Tympanic membrane is not perforated. Tympanic membrane mobility is normal. No middle ear effusion. No decreased hearing is noted.   Left Ear: No drainage or tenderness. Tympanic membrane is not perforated. Tympanic membrane mobility is normal.  No middle ear effusion. No decreased hearing is noted.     Nose:  Septal deviation present. No mucosal edema, rhinorrhea or polyps. No epistaxis. Turbinate hypertrophy.  Turbinates normal and no turbinate masses.  Right sinus exhibits no maxillary sinus tenderness and no frontal sinus " tenderness. Left sinus exhibits no maxillary sinus tenderness and no frontal sinus tenderness.   Marked right nasal septal deviation with near-complete right nasal cavity obstruction.    Mouth/Throat  Oropharynx clear and moist without lesions or asymmetry and normal uvula midline. He does not have dentures. Normal dentition. No oral lesions or mucous membrane lesions. No oropharyngeal exudate or posterior oropharyngeal erythema. Tonsils present, +1.  Mirror exam not performed due to patient tolerance.  Mirror exam not performed due to patient tolerance.      Neck:  Neck normal without thyromegaly masses, asymmetry, normal tracheal structure, crepitus, and tenderness, thyroid normal, trachea normal and no adenopathy. Normal range of motion present.     He has no cervical adenopathy.     Cardiovascular:   Regular rhythm.      Pulmonary/Chest:   Effort normal.     Psychiatric:   He has a normal mood and affect. His speech is normal and behavior is normal.     Neurological:   No cranial nerve deficit.     Skin:   No rash noted.       Procedure    None        Data Reviewed    WBC (K/uL)   Date Value   03/15/2018 5.66     Eosinophil% (%)   Date Value   03/15/2018 2.8     Eos # (K/uL)   Date Value   03/15/2018 0.2     Platelets (K/uL)   Date Value   03/15/2018 224     Glucose (mg/dl)   Date Value   03/13/2013 95     No results found for: IGE    No sinus imaging available.       Assessment:     1. Deviated nasal septum    2. Sleep-disordered breathing    3. Nasal turbinate hypertrophy         Plan:     I had a long discussion with the patient regarding his condition and the further workup and management options.  He would benefit from septoplasty and turbinate reduction for the treatment of his condition.  I discussed the risks, benefits and alternatives to surgery with the patient, as well as the expected postoperative course.  I gave him the opportunity to ask questions and I answered all of them.  I provided relevant  printed information on his condition for him to review at home.  Same-day discharge is anticipated.  He may have anesthesia triage by telephone.   The surgery will be tentatively scheduled for November 19.  He will need to return for a postoperative visit 1 week after surgery.     Follow-up for surgery.

## 2018-10-12 ENCOUNTER — TELEPHONE (OUTPATIENT)
Dept: OTOLARYNGOLOGY | Facility: CLINIC | Age: 38
End: 2018-10-12

## 2018-10-26 DIAGNOSIS — J34.2 DEVIATED NASAL SEPTUM: Primary | ICD-10-CM

## 2018-10-26 DIAGNOSIS — J34.3 NASAL TURBINATE HYPERTROPHY: ICD-10-CM

## 2018-10-26 DIAGNOSIS — G47.30 SLEEP-DISORDERED BREATHING: ICD-10-CM

## 2018-11-13 ENCOUNTER — TELEPHONE (OUTPATIENT)
Dept: OTOLARYNGOLOGY | Facility: CLINIC | Age: 38
End: 2018-11-13

## 2018-12-10 ENCOUNTER — TELEPHONE (OUTPATIENT)
Dept: OTOLARYNGOLOGY | Facility: CLINIC | Age: 38
End: 2018-12-10

## 2019-01-28 ENCOUNTER — TELEPHONE (OUTPATIENT)
Dept: HEMATOLOGY/ONCOLOGY | Facility: CLINIC | Age: 39
End: 2019-01-28

## 2019-02-27 ENCOUNTER — LAB VISIT (OUTPATIENT)
Dept: LAB | Facility: HOSPITAL | Age: 39
End: 2019-02-27
Attending: INTERNAL MEDICINE
Payer: COMMERCIAL

## 2019-02-27 ENCOUNTER — OFFICE VISIT (OUTPATIENT)
Dept: HEMATOLOGY/ONCOLOGY | Facility: CLINIC | Age: 39
End: 2019-02-27
Payer: COMMERCIAL

## 2019-02-27 VITALS
SYSTOLIC BLOOD PRESSURE: 122 MMHG | BODY MASS INDEX: 28.86 KG/M2 | HEIGHT: 67 IN | HEART RATE: 68 BPM | WEIGHT: 183.88 LBS | DIASTOLIC BLOOD PRESSURE: 78 MMHG

## 2019-02-27 DIAGNOSIS — D61.9 APLASTIC ANEMIA: ICD-10-CM

## 2019-02-27 DIAGNOSIS — K64.0 GRADE I HEMORRHOIDS: ICD-10-CM

## 2019-02-27 DIAGNOSIS — D61.9 APLASTIC ANEMIA: Primary | ICD-10-CM

## 2019-02-27 LAB
BASOPHILS # BLD AUTO: 0.02 K/UL
BASOPHILS NFR BLD: 0.3 %
DIFFERENTIAL METHOD: ABNORMAL
EOSINOPHIL # BLD AUTO: 0.1 K/UL
EOSINOPHIL NFR BLD: 2.3 %
ERYTHROCYTE [DISTWIDTH] IN BLOOD BY AUTOMATED COUNT: 11.9 %
HCT VFR BLD AUTO: 43.9 %
HGB BLD-MCNC: 15.1 G/DL
IMM GRANULOCYTES # BLD AUTO: 0.03 K/UL
IMM GRANULOCYTES NFR BLD AUTO: 0.5 %
LYMPHOCYTES # BLD AUTO: 2.4 K/UL
LYMPHOCYTES NFR BLD: 38.8 %
MCH RBC QN AUTO: 33.1 PG
MCHC RBC AUTO-ENTMCNC: 34.4 G/DL
MCV RBC AUTO: 96 FL
MONOCYTES # BLD AUTO: 0.6 K/UL
MONOCYTES NFR BLD: 9.4 %
NEUTROPHILS # BLD AUTO: 3 K/UL
NEUTROPHILS NFR BLD: 48.7 %
NRBC BLD-RTO: 0 /100 WBC
PLATELET # BLD AUTO: 209 K/UL
PMV BLD AUTO: 9.4 FL
RBC # BLD AUTO: 4.56 M/UL
WBC # BLD AUTO: 6.16 K/UL

## 2019-02-27 PROCEDURE — 99999 PR PBB SHADOW E&M-EST. PATIENT-LVL III: ICD-10-PCS | Mod: PBBFAC,,, | Performed by: INTERNAL MEDICINE

## 2019-02-27 PROCEDURE — 99213 PR OFFICE/OUTPT VISIT, EST, LEVL III, 20-29 MIN: ICD-10-PCS | Mod: S$GLB,,, | Performed by: INTERNAL MEDICINE

## 2019-02-27 PROCEDURE — 36415 COLL VENOUS BLD VENIPUNCTURE: CPT

## 2019-02-27 PROCEDURE — 99999 PR PBB SHADOW E&M-EST. PATIENT-LVL III: CPT | Mod: PBBFAC,,, | Performed by: INTERNAL MEDICINE

## 2019-02-27 PROCEDURE — 99213 OFFICE O/P EST LOW 20 MIN: CPT | Mod: S$GLB,,, | Performed by: INTERNAL MEDICINE

## 2019-02-27 PROCEDURE — 85025 COMPLETE CBC W/AUTO DIFF WBC: CPT

## 2019-02-27 NOTE — PROGRESS NOTES
HISTORY OF PRESENT ILLNESS:  Mr. Cam is a 38-year-old man who was treated in   2000 for aplastic anemia.  He had one course of horse antithymocyte globulin and   took cyclosporine.  Cyclosporine was stopped in November 2000.  He had an   excellent response to treatment and he has continued to do well since that   therapy without any sign of disease recurrence or complications of treatment.    In the past year, he has had no significant new problems.  However, in the last   24 hours, he has noted some perianal pain and suspects that he has a hemorrhoid.    He was recently constipated.    He continues to work as an  and he is about to take a vacation.    His children are doing well.  His son, who is age 18, has good academic   performance and will go to a university in about two years.  He also has a   10-year-old daughter.    ADDITIONAL PAST HISTORY, SYSTEM REVIEW, SOCIAL HISTORY AND FAMILY HISTORY:  Have   been reviewed and updated in the electronic record.    PHYSICAL EXAMINATION:  GENERAL APPEARANCE:  Well-developed, well-nourished man in no distress.  EYES:  No jaundice or pallor.  MOUTH AND THROAT:  No mucosal lesions.  Good dentition.  NECK:  No masses or bruits.  No thyroid abnormalities.  LYMPH NODES:  No enlarged cervical, axillary or inguinal nodes.  CHEST AND LUNGS:  Normal respiratory effort.  Clear to auscultation and   percussion.  HEART:  Regular rate and rhythm without murmur or gallop.  ABDOMEN:  Soft without masses or tenderness.  No hepatosplenomegaly.  EXTREMITIES:  No edema or cyanosis.  SKIN:  No suspicious lesions in the areas examined.  Several tattoos.  NEUROLOGIC:  Mental status is normal.  He is fully oriented.  Motor function is   normal.  RECTAL:  There is a thrombosed slightly tender hemorrhoid.  No sign of a   perianal abscess or fistula.    LABORATORY STUDIES:  Blood counts today include hemoglobin 15.1, WBC 6160 with a   normal differential and platelets  209,000.    IMPRESSION:  1.  History of aplastic anemia, in continued remission.  2.  Thrombosed external hemorrhoid.    RECOMMENDATIONS:  1.  I believe that he does not require regular followups by hematologist.  I   have given him a copy of his medical records and his current blood count.  I   have suggested that he see an internist yearly for general medical care.  2.  For treatment of the hemorrhoid, I recommended Sitz baths along with   Preparation H and benzocaine 10% to 20% applied up to five or six times a day.    If his symptoms persist or worsen, he should see a colorectal specialist.      ESDRAS/KEITH  dd: 02/27/2019 10:57:01 (CST)  td: 02/28/2019 06:57:36 (CST)  Doc ID   #[id number]  Job ID #[id number]    CC:

## 2019-09-17 ENCOUNTER — HOSPITAL ENCOUNTER (OUTPATIENT)
Dept: RADIOLOGY | Facility: HOSPITAL | Age: 39
Discharge: HOME | End: 2019-09-17
Attending: NURSE PRACTITIONER
Payer: COMMERCIAL

## 2019-09-17 ENCOUNTER — OFFICE VISIT (OUTPATIENT)
Dept: FAMILY MEDICINE | Facility: CLINIC | Age: 39
End: 2019-09-17
Payer: COMMERCIAL

## 2019-09-17 VITALS
WEIGHT: 182.13 LBS | TEMPERATURE: 98 F | HEART RATE: 60 BPM | OXYGEN SATURATION: 97 % | HEIGHT: 67 IN | DIASTOLIC BLOOD PRESSURE: 86 MMHG | SYSTOLIC BLOOD PRESSURE: 130 MMHG | BODY MASS INDEX: 28.58 KG/M2

## 2019-09-17 DIAGNOSIS — M79.673 HEEL PAIN, UNSPECIFIED LATERALITY: ICD-10-CM

## 2019-09-17 DIAGNOSIS — M79.671 RIGHT FOOT PAIN: ICD-10-CM

## 2019-09-17 DIAGNOSIS — M79.673 HEEL PAIN, UNSPECIFIED LATERALITY: Primary | ICD-10-CM

## 2019-09-17 PROCEDURE — 73630 X-RAY EXAM OF FOOT: CPT | Mod: TC,FY,PO,RT

## 2019-09-17 PROCEDURE — 90686 IIV4 VACC NO PRSV 0.5 ML IM: CPT | Mod: S$GLB,,, | Performed by: NURSE PRACTITIONER

## 2019-09-17 PROCEDURE — 99999 PR PBB SHADOW E&M-EST. PATIENT-LVL IV: CPT | Mod: PBBFAC,,, | Performed by: NURSE PRACTITIONER

## 2019-09-17 PROCEDURE — 73630 XR FOOT COMPLETE 3 VIEW RIGHT: ICD-10-PCS | Mod: 26,RT,, | Performed by: RADIOLOGY

## 2019-09-17 PROCEDURE — 90471 FLU VACCINE (QUAD) GREATER THAN OR EQUAL TO 3YO PRESERVATIVE FREE IM: ICD-10-PCS | Mod: S$GLB,,, | Performed by: NURSE PRACTITIONER

## 2019-09-17 PROCEDURE — 99214 PR OFFICE/OUTPT VISIT, EST, LEVL IV, 30-39 MIN: ICD-10-PCS | Mod: 25,S$GLB,, | Performed by: NURSE PRACTITIONER

## 2019-09-17 PROCEDURE — 90686 FLU VACCINE (QUAD) GREATER THAN OR EQUAL TO 3YO PRESERVATIVE FREE IM: ICD-10-PCS | Mod: S$GLB,,, | Performed by: NURSE PRACTITIONER

## 2019-09-17 PROCEDURE — 73630 X-RAY EXAM OF FOOT: CPT | Mod: 26,RT,, | Performed by: RADIOLOGY

## 2019-09-17 PROCEDURE — 99214 OFFICE O/P EST MOD 30 MIN: CPT | Mod: 25,S$GLB,, | Performed by: NURSE PRACTITIONER

## 2019-09-17 PROCEDURE — 90471 IMMUNIZATION ADMIN: CPT | Mod: S$GLB,,, | Performed by: NURSE PRACTITIONER

## 2019-09-17 PROCEDURE — 99999 PR PBB SHADOW E&M-EST. PATIENT-LVL IV: ICD-10-PCS | Mod: PBBFAC,,, | Performed by: NURSE PRACTITIONER

## 2019-09-17 RX ORDER — ETODOLAC 400 MG/1
400 TABLET, FILM COATED ORAL 2 TIMES DAILY
Qty: 20 TABLET | Refills: 0 | Status: SHIPPED | OUTPATIENT
Start: 2019-09-17 | End: 2019-09-27

## 2019-09-17 NOTE — PROGRESS NOTES
Subjective:       Patient ID: Mat Cam is a 39 y.o. male.    Chief Complaint: Foot Pain (symptoms started 2 weeks ago)    The patient is here today for   Foot pain   This is the first time that I am seeing the patient for this problem.       Foot Pain   This is a new problem. The current episode started 1 to 4 weeks ago. The problem occurs intermittently. The problem has been gradually worsening. Associated symptoms include arthralgias and myalgias. Pertinent negatives include no abdominal pain, anorexia, change in bowel habit, chest pain, chills, congestion, coughing, fever, joint swelling, nausea, numbness, rash or sore throat. The symptoms are aggravated by standing. He has tried rest and NSAIDs (insoles) for the symptoms.     Vitals:    09/17/19 0728   BP: 130/86   Pulse:    Temp:      Review of Systems   Constitutional: Positive for activity change. Negative for appetite change, chills and fever.   HENT: Negative.  Negative for congestion, drooling, postnasal drip, rhinorrhea, sinus pressure, sore throat, tinnitus and trouble swallowing.    Eyes: Negative.    Respiratory: Negative.  Negative for apnea, cough, choking, chest tightness and shortness of breath.    Cardiovascular: Negative.  Negative for chest pain.   Gastrointestinal: Negative.  Negative for abdominal pain, anorexia, change in bowel habit, diarrhea and nausea.   Endocrine: Negative.    Genitourinary: Negative.  Negative for decreased urine volume, difficulty urinating, dysuria, flank pain, genital sores, hematuria, penile pain, scrotal swelling, testicular pain and urgency.   Musculoskeletal: Positive for arthralgias and myalgias. Negative for back pain, gait problem and joint swelling.   Skin: Negative.  Negative for color change, pallor and rash.   Allergic/Immunologic: Negative.    Neurological: Negative.  Negative for numbness.   Hematological: Negative.  Negative for adenopathy.   Psychiatric/Behavioral: Negative.  Negative for  behavioral problems, confusion, hallucinations and sleep disturbance. The patient is not hyperactive.        Past Medical History:   Diagnosis Date    Anemia 2001    aplastic anemia    Blood transfusion     Calcium oxalate renal stones     Diverticulitis 2011       Objective:      Physical Exam   Constitutional: He is oriented to person, place, and time. He appears well-developed and well-nourished.   HENT:   Head: Normocephalic and atraumatic.   Right Ear: Hearing, tympanic membrane, external ear and ear canal normal.   Left Ear: Hearing, tympanic membrane, external ear and ear canal normal.   Nose: Nose normal. No mucosal edema or rhinorrhea. Right sinus exhibits no maxillary sinus tenderness and no frontal sinus tenderness. Left sinus exhibits no maxillary sinus tenderness and no frontal sinus tenderness.   Mouth/Throat: Uvula is midline, oropharynx is clear and moist and mucous membranes are normal.   Eyes: Pupils are equal, round, and reactive to light. Conjunctivae and EOM are normal.   Neck: Normal range of motion. Neck supple.   Cardiovascular: Intact distal pulses. Exam reveals no friction rub.   No murmur heard.  Pulmonary/Chest: No stridor. No respiratory distress. He has no wheezes.   Abdominal: Soft. Bowel sounds are normal.   Musculoskeletal: He exhibits no edema.        Right foot: There is tenderness.        Feet:    Neurological: He is alert and oriented to person, place, and time. No cranial nerve deficit.   Skin: Skin is warm and dry.   Psychiatric: He has a normal mood and affect. His behavior is normal. Judgment and thought content normal.   Nursing note and vitals reviewed.      Assessment:       1. Heel pain, unspecified laterality    2. Right foot pain        Plan:       Heel pain, unspecified laterality  -     X-Ray Foot Complete Right; Future; Expected date: 09/17/2019  -     etodolac (LODINE) 400 MG tablet; Take 1 tablet (400 mg total) by mouth 2 (two) times daily. for 10 days   Dispense: 20 tablet; Refill: 0    Right foot pain  -     etodolac (LODINE) 400 MG tablet; Take 1 tablet (400 mg total) by mouth 2 (two) times daily. for 10 days  Dispense: 20 tablet; Refill: 0  Suspect plantar fascitis - provided stretching and name of insert to purchase     Other orders  -     Influenza - Quadrivalent (PF)            Fu if not better

## 2019-12-17 ENCOUNTER — OFFICE VISIT (OUTPATIENT)
Dept: PODIATRY | Facility: CLINIC | Age: 39
End: 2019-12-17
Payer: COMMERCIAL

## 2019-12-17 VITALS
BODY MASS INDEX: 28.56 KG/M2 | HEART RATE: 65 BPM | DIASTOLIC BLOOD PRESSURE: 82 MMHG | SYSTOLIC BLOOD PRESSURE: 128 MMHG | HEIGHT: 67 IN | WEIGHT: 182 LBS

## 2019-12-17 DIAGNOSIS — B35.3 TINEA PEDIS OF LEFT FOOT: Primary | ICD-10-CM

## 2019-12-17 PROCEDURE — 99203 PR OFFICE/OUTPT VISIT, NEW, LEVL III, 30-44 MIN: ICD-10-PCS | Mod: S$GLB,,, | Performed by: PODIATRIST

## 2019-12-17 PROCEDURE — 99999 PR PBB SHADOW E&M-EST. PATIENT-LVL III: CPT | Mod: PBBFAC,,, | Performed by: PODIATRIST

## 2019-12-17 PROCEDURE — 99203 OFFICE O/P NEW LOW 30 MIN: CPT | Mod: S$GLB,,, | Performed by: PODIATRIST

## 2019-12-17 PROCEDURE — 99999 PR PBB SHADOW E&M-EST. PATIENT-LVL III: ICD-10-PCS | Mod: PBBFAC,,, | Performed by: PODIATRIST

## 2019-12-17 RX ORDER — CICLOPIROX OLAMINE 7.7 MG/G
CREAM TOPICAL 2 TIMES DAILY
Qty: 90 G | Refills: 2 | Status: SHIPPED | OUTPATIENT
Start: 2019-12-17

## 2019-12-17 NOTE — PROGRESS NOTES
Subjective:      Patient ID: Mat Cam is a 39 y.o. male.    Chief Complaint: Foot Problem (Itching, skin peeling - both feet); Foot Pain (re-ck - right heel pain); and Other Misc (PCP:  Dr Cox (S Dany NP) 9/17/19)    Mat REYES is a 39 y.o. male who presents to the podiatry clinic  with complaint of itching and peeling skin especially between the toes bilateral feet. He has had this problem before and a cream was prescribed that worked well at the time does not remember the name of it. Has also had a history of right heel pain but that has improved with stretching exercises and inserts. No other acute pedal complaints        Review of Systems   Constitution: Negative for chills and fever.   Cardiovascular: Negative for claudication and leg swelling.   Respiratory: Negative for shortness of breath.    Skin: Positive for color change and itching. Negative for nail changes and rash.   Musculoskeletal: Negative for muscle cramps, muscle weakness and myalgias.   Gastrointestinal: Negative for nausea and vomiting.   Neurological: Negative for focal weakness, loss of balance, numbness and paresthesias.           Objective:      Physical Exam   Constitutional: He is oriented to person, place, and time. He appears well-developed and well-nourished. No distress.   Cardiovascular:   Pulses:       Dorsalis pedis pulses are 2+ on the right side, and 2+ on the left side.        Posterior tibial pulses are 2+ on the right side, and 2+ on the left side.   < 3 sec capillary refill time to toes 1-5 bilateral. Toes and feet are warm to touch proximally with normal distal cooling b/l. There is some hair growth on the feet and toes b/l. There is no edema b/l. No spider veins or varicosities present b/l.      Musculoskeletal:   Equinus noted b/l ankles with < 10 deg DF noted. MMT 5/5 in DF/PF/Inv/Ev resistance with no reproduction of pain in any direction. Passive range of motion of ankle and pedal joints is painless b/l.      Neurological: He is alert and oriented to person, place, and time. He has normal strength. He displays no atrophy and no tremor. No sensory deficit. He exhibits normal muscle tone.   Negative tinel sign bilateral.   Skin: Skin is warm, dry and intact. No abrasion, no bruising, no burn, no ecchymosis, no laceration, no lesion and no petechiae noted. He is not diaphoretic. No cyanosis or erythema. No pallor. Nails show no clubbing.   Skin temperature, texture and turgor within normal limits.    Dry scale with superficial flakes over an erythematous base  without ulceration, drainage, pus, tracking, fluctuance, malodor, or cardinal signs infection mostly in the 3-4 interdigital spaces of the left foot.     Psychiatric: He has a normal mood and affect. His behavior is normal.             Assessment:       Encounter Diagnosis   Name Primary?    Tinea pedis of left foot Yes         Plan:       Mat REYES was seen today for foot problem, foot pain and other misc.    Diagnoses and all orders for this visit:    Tinea pedis of left foot    Other orders  -     ciclopirox (LOPROX) 0.77 % Crea; Apply topically 2 (two) times daily.      I counseled the patient on his conditions, their implications and medical management.    Loprox cream BID    Discussed wearing fresh socks, lysol to the shoes, drying between the toes well, drying out the work boots after working all day etc.    Return PRN if symptoms do not resolve with the loprox    Jer Membreno DPM

## 2019-12-29 ENCOUNTER — OFFICE VISIT (OUTPATIENT)
Dept: URGENT CARE | Facility: CLINIC | Age: 39
End: 2019-12-29
Payer: COMMERCIAL

## 2019-12-29 VITALS
SYSTOLIC BLOOD PRESSURE: 136 MMHG | OXYGEN SATURATION: 98 % | BODY MASS INDEX: 27.41 KG/M2 | HEART RATE: 60 BPM | WEIGHT: 175 LBS | DIASTOLIC BLOOD PRESSURE: 90 MMHG | TEMPERATURE: 99 F

## 2019-12-29 DIAGNOSIS — J31.0 CHRONIC RHINITIS: ICD-10-CM

## 2019-12-29 DIAGNOSIS — H65.01 NON-RECURRENT ACUTE SEROUS OTITIS MEDIA OF RIGHT EAR: Primary | ICD-10-CM

## 2019-12-29 PROCEDURE — 99214 OFFICE O/P EST MOD 30 MIN: CPT | Mod: S$GLB,,, | Performed by: PHYSICIAN ASSISTANT

## 2019-12-29 PROCEDURE — 99214 PR OFFICE/OUTPT VISIT, EST, LEVL IV, 30-39 MIN: ICD-10-PCS | Mod: S$GLB,,, | Performed by: PHYSICIAN ASSISTANT

## 2019-12-29 RX ORDER — PREDNISONE 10 MG/1
20 TABLET ORAL DAILY
Qty: 10 TABLET | Refills: 0 | Status: SHIPPED | OUTPATIENT
Start: 2019-12-29 | End: 2020-01-03

## 2019-12-29 RX ORDER — LORATADINE 10 MG
10 TABLET,DISINTEGRATING ORAL DAILY
Qty: 30 TABLET | Refills: 0 | Status: SHIPPED | OUTPATIENT
Start: 2019-12-29 | End: 2020-01-28

## 2019-12-29 NOTE — PATIENT INSTRUCTIONS
General Discharge Instructions   If you were prescribed a narcotic or controlled medication, do not drive or operate heavy equipment or machinery while taking these medications.  If you were prescribed antibiotics, please take them to completion.  You must understand that you've received an Urgent Care treatment only and that you may be released before all your medical problems are known or treated. You, the patient, will arrange for follow up care as instructed.  Follow up with your PCP or specialty clinic as directed in the next 1-2 weeks if not improved or as needed.  You can call [phone] to schedule an appointment with the appropriate provider.  If your condition worsens we recommend that you receive another evaluation at the emergency room immediately or contact your primary medical clinics after hours call service to discuss your concerns.  Please return here or go to the Emergency Department for any concerns or worsening of condition.      Earache, No Infection (Adult)  Earaches can happen without an infection. This occurs when air and fluid build up behind the eardrum causing a feeling of fullness and discomfort and reduced hearing. This is called otitis media with effusion (OME) or serous otitis media. It means there is fluid in the middle ear. It is not the same as acute otitis media, which is typically from infection.  OME can happen when you have a cold if congestion blocks the passage that drains the middle ear. This passage is called the eustachian tube. OME may also occur with nasal allergies or after a bacterial middle ear infection.    The pain or discomfort may come and go. You may hear clicking or popping sounds when you chew or swallow. You may feel that your balance is off. Or you may hear ringing in the ear.  It often takes from several weeks up to 3 months for the fluid to clear on its own. Oral pain relievers and ear drops help if there is pain. Decongestants and antihistamines  sometimes help. Antibiotics don't help since there is no infection. Your doctor may prescribe a nasal spray to help reduce swelling in the nose and eustachian tube. This can allow the ear to drain.  If your OME doesn't improve after 3 months, surgery may be used to drain the fluid and insert a small tube in the eardrum to allow continued drainage.  Because the middle ear fluid can become infected, it is important to watch for signs of an ear infection which may develop later. These signs include increased ear pain, fever, or drainage from the ear.  Home care  The following guidelines will help you care for yourself at home:  · You may use over-the-counter medicine as directed to control pain, unless another medicine was prescribed. If you have chronic liver or kidney disease or ever had a stomach ulcer or GI bleeding, talk with your doctor before using these medicines. Aspirin should never be used in anyone under 18 years of age who is ill with a fever. It may cause severe liver damage.  · You may use over-the-counter decongestants such as phenylephrine or pseudoephedrine. But they are not always helpful. Don't use nasal spray decongestants more than 3 days. Longer use can make congestion worse. Prescription nasal sprays from your doctor don't typically have those restrictions.  · Antihistamines may help if you are also having allergy symptoms.  · You may use medicines such as guaifenesin to thin mucus and promote drainage.  Follow-up care  Follow up with your healthcare provider or as advised if you are not feeling better after 3 days.  When to seek medical advice  Call your healthcare provider right away if any of the following occur:  · Your ear pain gets worse or does not start to improve   · Fever of 100.4°F (38°C) or higher, or as directed by your healthcare provider  · Fluid or blood draining from the ear  · Headache or sinus pain  · Stiff neck  · Unusual drowsiness or confusion  Date Last Reviewed:  10/1/2016  © 8848-2096 [employer]. [address] [place] [address], [place] [address]. All rights reserved. This information is not intended as a substitute for professional medical care. Always follow your healthcare professional's instructions.      Allergic Rhinitis  Allergic rhinitis is an allergic reaction that affects the nose, and often the eyes. Its often known as nasal allergies. Nasal allergies are often due to things in the environment that are breathed in. Depending what you are sensitive to, nasal allergies may occur only during certain seasons. Or they may occur year round. Common indoor allergens include house dust mites, mold, cockroaches, and pet dander. Outdoor allergens include pollen from trees, grasses, and weeds.   Symptoms include a drippy, stuffy, and itchy nose. They also include sneezing and red and itchy eyes. You may feel tired more often. Severe allergies may also affect your breathing and trigger a condition called asthma.   Tests can be done to see what allergens are affecting you. You may be referred to an allergy specialist for testing and further evaluation.  Home care  Your healthcare provider may prescribe medicines to help relieve allergy symptoms. These may include oral medicines, nasal sprays, or eye drops.  Ask your provider for advice on how to avoid substances that you are allergic to. Below are a few tips for each type of allergen.  Pet dander:  · Do not have pets with fur and feathers.  · If you can't avoid having a pet, keep it out of your bedroom and off upholstered furniture.  Pollen:  · When pollen counts are high, keep windows of your car and home closed. If possible, use an air conditioner instead.  · Wear a filter mask when mowing or doing yard work.  House dust mites:  · Wash bedding every week in warm water and detergent and dry on a hot setting.  · Cover the mattress, box spring, and pillows with allergy covers.   · If possible, sleep in a room with no  carpet, curtains, or upholstered furniture.  Cockroaches:  · Store food in sealed containers.  · Remove garbage from the home promptly.  · Fix water leaks  Mold:  · Keep humidity low by using a dehumidifier or air conditioner. Keep the dehumidifier and air conditioner clean and free of mold.  · Clean moldy areas with bleach and water.  In general:  · Vacuum once or twice a week. If possible, use a vacuum with a high-efficiency particulate air (HEPA) filter.  · Do not smoke. Avoid cigarette smoke. Cigarette smoke is an irritant that can make symptoms worse.  Follow-up care  Follow up as advised by the healthcare provider or our staff. If you were referred to an allergy specialist, make this appointment promptly.  When to seek medical advice  Call your healthcare provider right away if the following occur:  · Coughing or wheezing  · Fever greater than 100.4°F (38°C)  · Hives (raised red bumps)  · Continuing symptoms, new symptoms, or worsening symptoms  Call 911 right away if you have:  · Trouble breathing  · Severe swelling of the face or severe itching of the eyes or mouth  Date Last Reviewed: 3/1/2017  © 4773-8538 [employer]. [address] [place] [address], [place] [address]. All rights reserved. This information is not intended as a substitute for professional medical care. Always follow your healthcare professional's instructions.

## 2019-12-29 NOTE — PROGRESS NOTES
"Subjective:       Patient ID: Mat Cam is a 39 y.o. male.    Vitals:  weight is 79.4 kg (175 lb). His temperature is 98.5 °F (36.9 °C). His blood pressure is 136/90 (abnormal) and his pulse is 60. His oxygen saturation is 98%.     Chief Complaint: Otalgia    Patient complaining of sinus pressure and congestion. States that he "always has sinus issues" and uses a daily nasal spray however he feels like it's worsened in the past week. States that yesterday, he started having right ear pain with radiation to the top of his head. He tried ear drops but states that it felt like it was just sitting in his ear.    Otalgia    There is pain in the right ear. This is a new problem. The current episode started yesterday. The problem has been gradually worsening. Pertinent negatives include no coughing, diarrhea, headaches, rash, sore throat or vomiting. He has tried ear drops for the symptoms. The treatment provided no relief.       Constitution: Negative for chills, fatigue and fever.   HENT: Positive for ear pain, postnasal drip and sinus pressure. Negative for congestion and sore throat.    Neck: Negative for painful lymph nodes.   Cardiovascular: Negative for chest pain and leg swelling.   Eyes: Negative for double vision and blurred vision.   Respiratory: Negative for cough and shortness of breath.    Gastrointestinal: Negative for nausea, vomiting and diarrhea.   Genitourinary: Negative for dysuria, frequency and urgency.   Musculoskeletal: Negative for joint pain, joint swelling, muscle cramps and muscle ache.   Skin: Negative for color change, pale and rash.   Allergic/Immunologic: Negative for seasonal allergies.   Neurological: Negative for dizziness, history of vertigo, light-headedness, passing out and headaches.   Hematologic/Lymphatic: Negative for swollen lymph nodes, easy bruising/bleeding and history of blood clots. Does not bruise/bleed easily.   Psychiatric/Behavioral: Negative for nervous/anxious, " sleep disturbance and depression. The patient is not nervous/anxious.        Objective:      Physical Exam   Constitutional: He is oriented to person, place, and time. He appears well-developed and well-nourished. He is cooperative.  Non-toxic appearance. He does not have a sickly appearance. He does not appear ill. No distress.   HENT:   Head: Normocephalic and atraumatic.   Right Ear: Hearing, external ear and ear canal normal. There is tenderness. Tympanic membrane is bulging (mild). Tympanic membrane is not erythematous. A middle ear effusion (serous) is present.   Left Ear: Hearing, tympanic membrane, external ear and ear canal normal.   Nose: Mucosal edema and rhinorrhea present. No nasal deformity. No epistaxis. Right sinus exhibits maxillary sinus tenderness. Right sinus exhibits no frontal sinus tenderness. Left sinus exhibits no maxillary sinus tenderness and no frontal sinus tenderness.   Mouth/Throat: Uvula is midline, oropharynx is clear and moist and mucous membranes are normal. No trismus in the jaw. Normal dentition. No uvula swelling. No oropharyngeal exudate, posterior oropharyngeal edema or posterior oropharyngeal erythema.   Eyes: Conjunctivae and lids are normal. No scleral icterus.   Neck: Trachea normal, full passive range of motion without pain and phonation normal. Neck supple. No neck rigidity. No edema and no erythema present.   Cardiovascular: Normal rate, regular rhythm, normal heart sounds, intact distal pulses and normal pulses.   Pulmonary/Chest: Effort normal and breath sounds normal. No respiratory distress. He has no decreased breath sounds. He has no rhonchi.   Abdominal: Normal appearance.   Musculoskeletal: Normal range of motion. He exhibits no edema or deformity.   Neurological: He is alert and oriented to person, place, and time. He exhibits normal muscle tone. Coordination normal.   Skin: Skin is warm, dry, intact, not diaphoretic and not pale.   Psychiatric: He has a normal  mood and affect. His speech is normal and behavior is normal. Judgment and thought content normal. Cognition and memory are normal.   Nursing note and vitals reviewed.        Assessment:       1. Non-recurrent acute serous otitis media of right ear    2. Chronic rhinitis        Plan:         Non-recurrent acute serous otitis media of right ear  -     predniSONE (DELTASONE) 10 MG tablet; Take 2 tablets (20 mg total) by mouth once daily. for 5 days  Dispense: 10 tablet; Refill: 0    Chronic rhinitis  -     loratadine (CLARITIN REDITABS) 10 mg dissolvable tablet; Take 1 tablet (10 mg total) by mouth once daily.  Dispense: 30 tablet; Refill: 0      Patient Instructions   General Discharge Instructions   If you were prescribed a narcotic or controlled medication, do not drive or operate heavy equipment or machinery while taking these medications.  If you were prescribed antibiotics, please take them to completion.  You must understand that you've received an Urgent Care treatment only and that you may be released before all your medical problems are known or treated. You, the patient, will arrange for follow up care as instructed.  Follow up with your PCP or specialty clinic as directed in the next 1-2 weeks if not improved or as needed.  You can call [phone] to schedule an appointment with the appropriate provider.  If your condition worsens we recommend that you receive another evaluation at the emergency room immediately or contact your primary medical clinics after hours call service to discuss your concerns.  Please return here or go to the Emergency Department for any concerns or worsening of condition.      Earache, No Infection (Adult)  Earaches can happen without an infection. This occurs when air and fluid build up behind the eardrum causing a feeling of fullness and discomfort and reduced hearing. This is called otitis media with effusion (OME) or serous otitis media. It means there is fluid in the  middle ear. It is not the same as acute otitis media, which is typically from infection.  OME can happen when you have a cold if congestion blocks the passage that drains the middle ear. This passage is called the eustachian tube. OME may also occur with nasal allergies or after a bacterial middle ear infection.    The pain or discomfort may come and go. You may hear clicking or popping sounds when you chew or swallow. You may feel that your balance is off. Or you may hear ringing in the ear.  It often takes from several weeks up to 3 months for the fluid to clear on its own. Oral pain relievers and ear drops help if there is pain. Decongestants and antihistamines sometimes help. Antibiotics don't help since there is no infection. Your doctor may prescribe a nasal spray to help reduce swelling in the nose and eustachian tube. This can allow the ear to drain.  If your OME doesn't improve after 3 months, surgery may be used to drain the fluid and insert a small tube in the eardrum to allow continued drainage.  Because the middle ear fluid can become infected, it is important to watch for signs of an ear infection which may develop later. These signs include increased ear pain, fever, or drainage from the ear.  Home care  The following guidelines will help you care for yourself at home:  · You may use over-the-counter medicine as directed to control pain, unless another medicine was prescribed. If you have chronic liver or kidney disease or ever had a stomach ulcer or GI bleeding, talk with your doctor before using these medicines. Aspirin should never be used in anyone under 18 years of age who is ill with a fever. It may cause severe liver damage.  · You may use over-the-counter decongestants such as phenylephrine or pseudoephedrine. But they are not always helpful. Don't use nasal spray decongestants more than 3 days. Longer use can make congestion worse. Prescription nasal sprays from your doctor don't typically  have those restrictions.  · Antihistamines may help if you are also having allergy symptoms.  · You may use medicines such as guaifenesin to thin mucus and promote drainage.  Follow-up care  Follow up with your healthcare provider or as advised if you are not feeling better after 3 days.  When to seek medical advice  Call your healthcare provider right away if any of the following occur:  · Your ear pain gets worse or does not start to improve   · Fever of 100.4°F (38°C) or higher, or as directed by your healthcare provider  · Fluid or blood draining from the ear  · Headache or sinus pain  · Stiff neck  · Unusual drowsiness or confusion  Date Last Reviewed: 10/1/2016  © 1432-8259 [employer]. [address] [place] [address], [place] [address]. All rights reserved. This information is not intended as a substitute for professional medical care. Always follow your healthcare professional's instructions.      Allergic Rhinitis  Allergic rhinitis is an allergic reaction that affects the nose, and often the eyes. Its often known as nasal allergies. Nasal allergies are often due to things in the environment that are breathed in. Depending what you are sensitive to, nasal allergies may occur only during certain seasons. Or they may occur year round. Common indoor allergens include house dust mites, mold, cockroaches, and pet dander. Outdoor allergens include pollen from trees, grasses, and weeds.   Symptoms include a drippy, stuffy, and itchy nose. They also include sneezing and red and itchy eyes. You may feel tired more often. Severe allergies may also affect your breathing and trigger a condition called asthma.   Tests can be done to see what allergens are affecting you. You may be referred to an allergy specialist for testing and further evaluation.  Home care  Your healthcare provider may prescribe medicines to help relieve allergy symptoms. These may include oral medicines, nasal sprays, or eye drops.  Ask your  provider for advice on how to avoid substances that you are allergic to. Below are a few tips for each type of allergen.  Pet dander:  · Do not have pets with fur and feathers.  · If you can't avoid having a pet, keep it out of your bedroom and off upholstered furniture.  Pollen:  · When pollen counts are high, keep windows of your car and home closed. If possible, use an air conditioner instead.  · Wear a filter mask when mowing or doing yard work.  House dust mites:  · Wash bedding every week in warm water and detergent and dry on a hot setting.  · Cover the mattress, box spring, and pillows with allergy covers.   · If possible, sleep in a room with no carpet, curtains, or upholstered furniture.  Cockroaches:  · Store food in sealed containers.  · Remove garbage from the home promptly.  · Fix water leaks  Mold:  · Keep humidity low by using a dehumidifier or air conditioner. Keep the dehumidifier and air conditioner clean and free of mold.  · Clean moldy areas with bleach and water.  In general:  · Vacuum once or twice a week. If possible, use a vacuum with a high-efficiency particulate air (HEPA) filter.  · Do not smoke. Avoid cigarette smoke. Cigarette smoke is an irritant that can make symptoms worse.  Follow-up care  Follow up as advised by the healthcare provider or our staff. If you were referred to an allergy specialist, make this appointment promptly.  When to seek medical advice  Call your healthcare provider right away if the following occur:  · Coughing or wheezing  · Fever greater than 100.4°F (38°C)  · Hives (raised red bumps)  · Continuing symptoms, new symptoms, or worsening symptoms  Call 911 right away if you have:  · Trouble breathing  · Severe swelling of the face or severe itching of the eyes or mouth  Date Last Reviewed: 3/1/2017  © 8287-5069 [employer]. [address] [place] [address], [place] [address]. All rights reserved. This information is not intended as a substitute for  professional medical care. Always follow your healthcare professional's instructions.

## 2020-02-03 DIAGNOSIS — Z20.828 EXPOSURE TO THE FLU: Primary | ICD-10-CM

## 2020-02-03 RX ORDER — OSELTAMIVIR PHOSPHATE 75 MG/1
75 CAPSULE ORAL 2 TIMES DAILY
Qty: 10 CAPSULE | Refills: 0 | Status: SHIPPED | OUTPATIENT
Start: 2020-02-03 | End: 2020-02-08

## 2020-07-20 ENCOUNTER — OFFICE VISIT (OUTPATIENT)
Dept: FAMILY MEDICINE | Facility: CLINIC | Age: 40
End: 2020-07-20
Payer: COMMERCIAL

## 2020-07-20 VITALS
RESPIRATION RATE: 17 BRPM | BODY MASS INDEX: 29.37 KG/M2 | SYSTOLIC BLOOD PRESSURE: 122 MMHG | WEIGHT: 182.75 LBS | HEIGHT: 66 IN | HEART RATE: 74 BPM | DIASTOLIC BLOOD PRESSURE: 76 MMHG | OXYGEN SATURATION: 97 % | TEMPERATURE: 98 F

## 2020-07-20 DIAGNOSIS — B35.3 TINEA PEDIS OF LEFT FOOT: Primary | ICD-10-CM

## 2020-07-20 PROCEDURE — 99999 PR PBB SHADOW E&M-EST. PATIENT-LVL IV: CPT | Mod: PBBFAC,,, | Performed by: NURSE PRACTITIONER

## 2020-07-20 PROCEDURE — 99213 OFFICE O/P EST LOW 20 MIN: CPT | Mod: S$GLB,,, | Performed by: NURSE PRACTITIONER

## 2020-07-20 PROCEDURE — 99999 PR PBB SHADOW E&M-EST. PATIENT-LVL IV: ICD-10-PCS | Mod: PBBFAC,,, | Performed by: NURSE PRACTITIONER

## 2020-07-20 PROCEDURE — 99213 PR OFFICE/OUTPT VISIT, EST, LEVL III, 20-29 MIN: ICD-10-PCS | Mod: S$GLB,,, | Performed by: NURSE PRACTITIONER

## 2020-07-20 RX ORDER — CLOTRIMAZOLE AND BETAMETHASONE DIPROPIONATE 10; .64 MG/G; MG/G
CREAM TOPICAL 2 TIMES DAILY
Qty: 15 G | Refills: 1 | Status: SHIPPED | OUTPATIENT
Start: 2020-07-20 | End: 2023-09-26 | Stop reason: SDUPTHER

## 2020-07-20 NOTE — PROGRESS NOTES
"Subjective:       Patient ID: Mat Cam is a 40 y.o. male present to the clinic for left foot fungus. Onset two weeks. Symptoms include itching, slight odor, and skin peeling. He denies pain. Performs epsom salt soaks daily in evening which was not effective. He has a history of tinea pedis and experienced these symptoms in the past.     Chief Complaint: Foot Pain (fungus/ left )    Vitals:    07/20/20 1250   BP: 122/76   Pulse: 74   Resp: 17   Temp: 97.5 °F (36.4 °C)   TempSrc: Oral   SpO2: 97%   Weight: 82.9 kg (182 lb 12.2 oz)   Height: 5' 6" (1.676 m)   PainSc: 0-No pain   PainLoc: Generalized     Body mass index is 29.5 kg/m².    Past Medical History:   Diagnosis Date    Anemia 2001    aplastic anemia    Blood transfusion     Calcium oxalate renal stones     Diverticulitis 2011     Past Surgical History:   Procedure Laterality Date    procedure on eye muscles as child       Social History     Socioeconomic History    Marital status:      Spouse name: Not on file    Number of children: Not on file    Years of education: Not on file    Highest education level: Not on file   Occupational History    Not on file   Social Needs    Financial resource strain: Not on file    Food insecurity     Worry: Not on file     Inability: Not on file    Transportation needs     Medical: Not on file     Non-medical: Not on file   Tobacco Use    Smoking status: Never Smoker    Smokeless tobacco: Never Used   Substance and Sexual Activity    Alcohol use: No     Alcohol/week: 0.0 standard drinks    Drug use: No    Sexual activity: Yes     Partners: Female   Lifestyle    Physical activity     Days per week: Not on file     Minutes per session: Not on file    Stress: Not on file   Relationships    Social connections     Talks on phone: Not on file     Gets together: Not on file     Attends [employer] service: Not on file     Active member of club or organization: Not on file     Attends meetings of clubs " or organizations: Not on file     Relationship status: Not on file   Other Topics Concern    Not on file   Social History Narrative    Not on file       Review of patient's allergies indicates:  No Known Allergies    Current Outpatient Medications:     ciclopirox (LOPROX) 0.77 % Crea, Apply topically 2 (two) times daily. (Patient not taking: Reported on 12/29/2019), Disp: 90 g, Rfl: 2    clotrimazole-betamethasone 1-0.05% (LOTRISONE) cream, Apply topically 2 (two) times daily., Disp: 15 g, Rfl: 1    loratadine (CLARITIN REDITABS) 10 mg dissolvable tablet, Take 1 tablet (10 mg total) by mouth once daily., Disp: 30 tablet, Rfl: 0    Review of Systems   Constitutional: Negative for activity change, appetite change, chills, diaphoresis, fatigue, fever and unexpected weight change.   HENT: Negative for congestion, ear discharge, ear pain, hearing loss, postnasal drip, rhinorrhea, sinus pressure, sinus pain, sore throat, trouble swallowing and voice change.    Eyes: Negative for photophobia, pain, discharge and visual disturbance.   Respiratory: Negative for apnea, cough, chest tightness, shortness of breath and wheezing.    Cardiovascular: Negative for chest pain, palpitations and leg swelling.   Gastrointestinal: Negative for abdominal distention, abdominal pain, constipation, diarrhea, nausea and vomiting.   Endocrine: Negative for polydipsia, polyphagia and polyuria.   Genitourinary: Negative for decreased urine volume, difficulty urinating, dysuria, flank pain, frequency and urgency.   Musculoskeletal: Negative for arthralgias, back pain, gait problem, joint swelling and neck pain.   Skin: Positive for color change and rash. Negative for pallor and wound.   Neurological: Negative for dizziness, facial asymmetry, speech difficulty, weakness, light-headedness, numbness and headaches.   Hematological: Negative for adenopathy.   Psychiatric/Behavioral: Negative for agitation, behavioral problems, confusion,  decreased concentration, hallucinations and sleep disturbance. The patient is not nervous/anxious.        Objective:      Physical Exam  Vitals signs and nursing note reviewed.   Constitutional:       General: He is not in acute distress.     Appearance: Normal appearance. He is well-developed. He is obese. He is not ill-appearing, toxic-appearing or diaphoretic.   HENT:      Head: Normocephalic and atraumatic.      Right Ear: Tympanic membrane, ear canal and external ear normal. There is no impacted cerumen.      Left Ear: Tympanic membrane, ear canal and external ear normal. There is no impacted cerumen.      Nose: Nose normal. No congestion or rhinorrhea.      Mouth/Throat:      Mouth: Mucous membranes are moist.      Pharynx: Oropharynx is clear. No oropharyngeal exudate or posterior oropharyngeal erythema.   Eyes:      General: No scleral icterus.        Right eye: No discharge.         Left eye: No discharge.      Extraocular Movements: Extraocular movements intact.      Conjunctiva/sclera: Conjunctivae normal.      Pupils: Pupils are equal, round, and reactive to light.   Neck:      Musculoskeletal: Normal range of motion and neck supple. No neck rigidity or muscular tenderness.      Thyroid: No thyromegaly.      Vascular: No carotid bruit or JVD.      Trachea: No tracheal deviation.   Cardiovascular:      Rate and Rhythm: Normal rate and regular rhythm.      Pulses: Normal pulses.           Dorsalis pedis pulses are 2+ on the right side and 2+ on the left side.        Posterior tibial pulses are 2+ on the right side and 2+ on the left side.      Heart sounds: Normal heart sounds. No murmur. No friction rub. No gallop.    Pulmonary:      Effort: Pulmonary effort is normal. No respiratory distress.      Breath sounds: Normal breath sounds. No stridor. No wheezing, rhonchi or rales.   Chest:      Chest wall: No tenderness.   Abdominal:      General: Abdomen is flat. Bowel sounds are normal. There is no  distension.      Palpations: Abdomen is soft. There is no mass.      Tenderness: There is no abdominal tenderness. There is no right CVA tenderness, left CVA tenderness, guarding or rebound.      Hernia: No hernia is present.   Musculoskeletal: Normal range of motion.         General: No swelling, tenderness, deformity or signs of injury.      Right lower leg: No edema.      Left lower leg: No edema.      Right foot: Normal range of motion. No deformity, bunion, Charcot foot, foot drop or prominent metatarsal heads.      Left foot: Normal range of motion. No deformity, bunion, Charcot foot, foot drop or prominent metatarsal heads.   Feet:      Right foot:      Protective Sensation: 10 sites tested. 10 sites sensed.      Skin integrity: Skin breakdown, erythema and dry skin present. No ulcer, blister, warmth, callus or fissure.      Toenail Condition: Right toenails are normal.      Left foot:      Protective Sensation: 10 sites tested. 10 sites sensed.      Skin integrity: Skin integrity normal. No ulcer, blister, skin breakdown, erythema, warmth, callus, dry skin or fissure.      Toenail Condition: Left toenails are normal.      Comments: Left foot slightly redden and flaky skin.   Lymphadenopathy:      Cervical: No cervical adenopathy.   Skin:     General: Skin is warm and dry.      Capillary Refill: Capillary refill takes less than 2 seconds.      Coloration: Skin is not jaundiced or pale.      Findings: No bruising, erythema, lesion or rash.   Neurological:      General: No focal deficit present.      Mental Status: He is alert and oriented to person, place, and time.      Cranial Nerves: No cranial nerve deficit.      Sensory: No sensory deficit.      Motor: No weakness or abnormal muscle tone.      Coordination: Coordination normal.      Gait: Gait normal.      Deep Tendon Reflexes: Reflexes normal.   Psychiatric:         Mood and Affect: Mood normal.         Behavior: Behavior normal.         Thought Content:  Thought content normal.         Judgment: Judgment normal.         Assessment:       1. Tinea pedis of left foot        Plan:    Mat REYES was seen today for foot pain.    Diagnoses and all orders for this visit:    Tinea pedis of left foot  -     clotrimazole-betamethasone 1-0.05% (LOTRISONE) cream; Apply topically 2 (two) times daily.  -     CBC auto differential; Future  -     Comprehensive metabolic panel; Future  -     Hemoglobin A1C; Future  -     Lipid Panel; Future  -     TSH; Future  It is important to keep the feet dry. Use absorbent cotton socks and change them if they become sweaty. Or wear an open-toe shoe or sandal. Wash the feet at least once a day with soap and water. Apply the antifungal cream as prescribed. Some antifungal creams are available without a prescription. It may take a week before the rash starts to improve. It can take about 3 to 4 weeks to completely clear. Continue the medicine until the rash is all gone. Use over-the-counter antifungal powders or sprays on your feet after exposure to high-risk environments, such as public showers, gyms, and locker rooms. This can help prevent future infections. Wearing appropriate shoes in these situations can help.    Patient education provided.  All questions and concerns addressed  Patient verbalizes understanding    Follow up in about 2 weeks (around 8/3/2020).

## 2020-07-20 NOTE — PATIENT INSTRUCTIONS
Athletes Foot    Athletes foot (tinea pedis) is caused by a fungal infection in the skin. It affects the skin between the toes, causing cracks in the skin called fissures. It can also affect the bottom of the foot where it causes dry white scales and peeling of the skin. This infection is more likely to occur when the foot is in hot, sweaty socks and shoes for long periods of time. You may feel itching and burning between your toes. This infection is treated with skin creams or medicine taken by mouth.  Home care  The following are general care guidelines:  · It is important to keep the feet dry. Use absorbent cotton socks and change them if they become sweaty. Or wear an open-toe shoe or sandal. Wash the feet at least once a day with soap and water.  · Apply the antifungal cream as prescribed. Some antifungal creams are available without a prescription.  · It may take a week before the rash starts to improve. It can take about 3 to 4 weeks to completely clear. Continue the medicine until the rash is all gone.  · Use over-the-counter antifungal powders or sprays on your feet after exposure to high-risk environments, such as public showers, gyms, and locker rooms. This can help prevent future infections. Wearing appropriate shoes in these situations can help.  Prevention  The following tips may help prevent athletes foot:  · Don't share shoes or socks with someone who has athlete's foot.  · Don't walk barefoot in places where a fungal infection can spread quickly such as locker rooms, showers, and swimming pools.  · Change socks regularly.  · Alternate shoes to assist in drying.  Follow-up care  Follow up with your healthcare provider as recommended if the rash does not improve after 10 days of treatment, or if the rash continues to spread.  When to seek medical care  Get medical attention right away if any of the following occur:  · Fever of 100.4°F (38°C) or higher, or as directed  · Increasing redness or  swelling of the foot  · Infection comes back soon after treatment  · Pus draining from cracks in the skin  Date Last Reviewed: 8/1/2016  © 1649-5062 [employer]. [address] [place] [address], [place] [address]. All rights reserved. This information is not intended as a substitute for professional medical care. Always follow your healthcare professional's instructions.

## 2020-08-08 ENCOUNTER — PATIENT OUTREACH (OUTPATIENT)
Dept: ADMINISTRATIVE | Facility: OTHER | Age: 40
End: 2020-08-08

## 2020-08-08 NOTE — PROGRESS NOTES
Patient's chart was reviewed for overdue POORNIMA topics.  Immunizations reconciled.    Orders placed:n/a  Labs Linked:n/a

## 2020-08-10 ENCOUNTER — HOSPITAL ENCOUNTER (OUTPATIENT)
Dept: RADIOLOGY | Facility: HOSPITAL | Age: 40
Discharge: HOME | End: 2020-08-10
Attending: ORTHOPAEDIC SURGERY
Payer: COMMERCIAL

## 2020-08-10 ENCOUNTER — OFFICE VISIT (OUTPATIENT)
Dept: ORTHOPEDICS | Facility: CLINIC | Age: 40
End: 2020-08-10
Payer: COMMERCIAL

## 2020-08-10 VITALS — BODY MASS INDEX: 29.37 KG/M2 | HEIGHT: 66 IN | WEIGHT: 182.75 LBS

## 2020-08-10 DIAGNOSIS — M25.562 LEFT KNEE PAIN, UNSPECIFIED CHRONICITY: ICD-10-CM

## 2020-08-10 DIAGNOSIS — M25.562 ACUTE PAIN OF LEFT KNEE: Primary | ICD-10-CM

## 2020-08-10 DIAGNOSIS — M25.562 LEFT KNEE PAIN, UNSPECIFIED CHRONICITY: Primary | ICD-10-CM

## 2020-08-10 PROCEDURE — 73562 X-RAY EXAM OF KNEE 3: CPT | Mod: 26,LT,, | Performed by: RADIOLOGY

## 2020-08-10 PROCEDURE — 73560 XR KNEE ORTHO LEFT: ICD-10-PCS | Mod: 26,RT,, | Performed by: RADIOLOGY

## 2020-08-10 PROCEDURE — 99999 PR PBB SHADOW E&M-EST. PATIENT-LVL III: ICD-10-PCS | Mod: PBBFAC,,, | Performed by: ORTHOPAEDIC SURGERY

## 2020-08-10 PROCEDURE — 73560 X-RAY EXAM OF KNEE 1 OR 2: CPT | Mod: 26,RT,, | Performed by: RADIOLOGY

## 2020-08-10 PROCEDURE — 73560 X-RAY EXAM OF KNEE 1 OR 2: CPT | Mod: TC,PO,RT

## 2020-08-10 PROCEDURE — 99203 OFFICE O/P NEW LOW 30 MIN: CPT | Mod: S$GLB,,, | Performed by: ORTHOPAEDIC SURGERY

## 2020-08-10 PROCEDURE — 99999 PR PBB SHADOW E&M-EST. PATIENT-LVL III: CPT | Mod: PBBFAC,,, | Performed by: ORTHOPAEDIC SURGERY

## 2020-08-10 PROCEDURE — 73562 XR KNEE ORTHO LEFT: ICD-10-PCS | Mod: 26,LT,, | Performed by: RADIOLOGY

## 2020-08-10 PROCEDURE — 99203 PR OFFICE/OUTPT VISIT, NEW, LEVL III, 30-44 MIN: ICD-10-PCS | Mod: S$GLB,,, | Performed by: ORTHOPAEDIC SURGERY

## 2020-08-10 PROCEDURE — 73562 X-RAY EXAM OF KNEE 3: CPT | Mod: TC,PO,LT

## 2020-08-10 NOTE — PROGRESS NOTES
40 years old complaining of pain in the left knee for couple weeks time.  Pain with deep flexion.  Points of medial-sided knee as location of pain.  Describes a popping catching in his knee.  Resting seems to make it better deep flexion makes it worse    Exam shows tenderness to medial joint line and positive Alex testing no signs infection or instability    X-rays are negative    Assessment:  Probable left medial meniscal tear    Plan:  He has had symptoms for 2 weeks, will treat conservatively, if he remains symptomatic a call will get an MRI of his knee and go from there    Further History  Aching pain  Worse with activity  Relieved with rest  No other associated symptoms  No other radiation    Further Exam  Alert and oriented  Pleasant  Contralateral limb has appropriate range of motion for age and condition  Contralateral limb has appropriate strength for age and condition  Contralateral limb has appropriate stability  for age and condition  No adenopathy  Pulses are appropriate for current condition  Skin is intact        Chief Complaint    Chief Complaint   Patient presents with    Left Knee - Pain       HPI  Mat Cam is a 40 y.o.  male who presents with       Past Medical History  Past Medical History:   Diagnosis Date    Anemia 2001    aplastic anemia    Blood transfusion     Calcium oxalate renal stones     Diverticulitis 2011       Past Surgical History  Past Surgical History:   Procedure Laterality Date    procedure on eye muscles as child         Medications  Current Outpatient Medications   Medication Sig    ciclopirox (LOPROX) 0.77 % Crea Apply topically 2 (two) times daily. (Patient not taking: Reported on 12/29/2019)    clotrimazole-betamethasone 1-0.05% (LOTRISONE) cream Apply topically 2 (two) times daily. (Patient not taking: Reported on 8/10/2020)    loratadine (CLARITIN REDITABS) 10 mg dissolvable tablet Take 1 tablet (10 mg total) by mouth once daily.     No current  facility-administered medications for this visit.        Allergies  Review of patient's allergies indicates:  No Known Allergies    Family History  Family History   Problem Relation Age of Onset    Stroke Father        Social History  Social History     Socioeconomic History    Marital status:      Spouse name: Not on file    Number of children: Not on file    Years of education: Not on file    Highest education level: Not on file   Occupational History    Not on file   Social Needs    Financial resource strain: Not on file    Food insecurity     Worry: Not on file     Inability: Not on file    Transportation needs     Medical: Not on file     Non-medical: Not on file   Tobacco Use    Smoking status: Never Smoker    Smokeless tobacco: Never Used   Substance and Sexual Activity    Alcohol use: No     Alcohol/week: 0.0 standard drinks    Drug use: No    Sexual activity: Yes     Partners: Female   Lifestyle    Physical activity     Days per week: Not on file     Minutes per session: Not on file    Stress: Not on file   Relationships    Social connections     Talks on phone: Not on file     Gets together: Not on file     Attends [employer] service: Not on file     Active member of club or organization: Not on file     Attends meetings of clubs or organizations: Not on file     Relationship status: Not on file   Other Topics Concern    Not on file   Social History Narrative    Not on file               Review of Systems     Constitutional: Negative    HENT: Negative  Eyes: Negative  Respiratory: Negative  Cardiovascular: Negative  Musculoskeletal: HPI  Skin: Negative  Neurological: Negative  Hematological: Negative  Endocrine: Negative                 Physical Exam    There were no vitals filed for this visit.  Body mass index is 29.5 kg/m².  Physical Examination:     General appearance -  well appearing, and in no distress  Mental status - awake  Neck - supple  Chest -  symmetric air  entry  Heart - normal rate   Abdomen - soft      Assessment     1. Acute pain of left knee          Plan

## 2020-09-30 ENCOUNTER — HOSPITAL ENCOUNTER (OUTPATIENT)
Dept: RADIOLOGY | Facility: HOSPITAL | Age: 40
Discharge: HOME | End: 2020-09-30
Attending: ORTHOPAEDIC SURGERY
Payer: COMMERCIAL

## 2020-09-30 ENCOUNTER — OFFICE VISIT (OUTPATIENT)
Dept: ORTHOPEDICS | Facility: CLINIC | Age: 40
End: 2020-09-30
Payer: COMMERCIAL

## 2020-09-30 ENCOUNTER — TELEPHONE (OUTPATIENT)
Dept: ORTHOPEDICS | Facility: CLINIC | Age: 40
End: 2020-09-30

## 2020-09-30 VITALS — BODY MASS INDEX: 29.37 KG/M2 | WEIGHT: 182.75 LBS | HEIGHT: 66 IN

## 2020-09-30 DIAGNOSIS — M25.569 KNEE PAIN, UNSPECIFIED CHRONICITY, UNSPECIFIED LATERALITY: ICD-10-CM

## 2020-09-30 DIAGNOSIS — M25.562 CHRONIC PAIN OF LEFT KNEE: ICD-10-CM

## 2020-09-30 DIAGNOSIS — M25.562 LEFT KNEE PAIN, UNSPECIFIED CHRONICITY: Primary | ICD-10-CM

## 2020-09-30 DIAGNOSIS — G89.29 CHRONIC PAIN OF LEFT KNEE: ICD-10-CM

## 2020-09-30 DIAGNOSIS — G89.29 CHRONIC PAIN OF LEFT KNEE: Primary | ICD-10-CM

## 2020-09-30 DIAGNOSIS — M25.562 CHRONIC PAIN OF LEFT KNEE: Primary | ICD-10-CM

## 2020-09-30 PROCEDURE — 97760 ORTHOTIC MGMT&TRAING 1ST ENC: CPT | Mod: S$GLB,,, | Performed by: ORTHOPAEDIC SURGERY

## 2020-09-30 PROCEDURE — 99999 PR PBB SHADOW E&M-EST. PATIENT-LVL III: ICD-10-PCS | Mod: PBBFAC,,, | Performed by: ORTHOPAEDIC SURGERY

## 2020-09-30 PROCEDURE — 73560 XR KNEE ORTHO LEFT: ICD-10-PCS | Mod: 26,RT,, | Performed by: RADIOLOGY

## 2020-09-30 PROCEDURE — 97760 PR ORTHOTIC MGMT&TRAINJ INITIAL ENC EA 15 MINS: ICD-10-PCS | Mod: S$GLB,,, | Performed by: ORTHOPAEDIC SURGERY

## 2020-09-30 PROCEDURE — 99213 OFFICE O/P EST LOW 20 MIN: CPT | Mod: PBBFAC,25,PN | Performed by: ORTHOPAEDIC SURGERY

## 2020-09-30 PROCEDURE — 99213 PR OFFICE/OUTPT VISIT, EST, LEVL III, 20-29 MIN: ICD-10-PCS | Mod: 25,S$GLB,, | Performed by: ORTHOPAEDIC SURGERY

## 2020-09-30 PROCEDURE — 73560 X-RAY EXAM OF KNEE 1 OR 2: CPT | Mod: 26,RT,, | Performed by: RADIOLOGY

## 2020-09-30 PROCEDURE — 73560 X-RAY EXAM OF KNEE 1 OR 2: CPT | Mod: TC,PO,RT

## 2020-09-30 PROCEDURE — 73562 X-RAY EXAM OF KNEE 3: CPT | Mod: TC,PO,LT

## 2020-09-30 PROCEDURE — 99999 PR PBB SHADOW E&M-EST. PATIENT-LVL III: CPT | Mod: PBBFAC,,, | Performed by: ORTHOPAEDIC SURGERY

## 2020-09-30 PROCEDURE — 99213 OFFICE O/P EST LOW 20 MIN: CPT | Mod: 25,S$GLB,, | Performed by: ORTHOPAEDIC SURGERY

## 2020-09-30 PROCEDURE — 73562 X-RAY EXAM OF KNEE 3: CPT | Mod: 26,LT,, | Performed by: RADIOLOGY

## 2020-09-30 PROCEDURE — 73562 XR KNEE ORTHO LEFT: ICD-10-PCS | Mod: 26,LT,, | Performed by: RADIOLOGY

## 2020-09-30 NOTE — PROGRESS NOTES
40 years old with pain in his left knee.  We diagnosed him with a probable left medial meniscal tear a couple months ago he is doing better until just last night twisted his knee getting into bed felt a pop has been more painful form since then    Exam shows tenderness to medial joint line guarding a fair amount    X-rays are negative    Assessment:  Medial meniscal tear left knee    Plan:  We will get MRI of his knee and see him back after that to discuss different treatment options, in the meantime will get him a knee immobilizer for comfort    We performed a custom orthotic/brace fitting, adjusting and training with the patient. The patient demonstrated understanding and proper care. This was performed for 15 minutes.

## 2020-09-30 NOTE — TELEPHONE ENCOUNTER
Patient scheduled  ----- Message from Donny Ayon sent at 9/30/2020  9:02 AM CDT -----  Type:  Same Day Appointment Request    Caller is requesting a same day appointment.  Caller declined first available appointment listed below.      Name of Caller:  Patient  When is the first available appointment? 10/02  Symptoms: Knee pain  Best Call Back Number: [phone]  Additional Information:

## 2020-10-02 ENCOUNTER — TELEPHONE (OUTPATIENT)
Dept: ORTHOPEDICS | Facility: CLINIC | Age: 40
End: 2020-10-02

## 2020-10-02 NOTE — TELEPHONE ENCOUNTER
Spoke with patient in regards to MRI. Explained that approval for MRI may take a few days to go through. Understanding verbalized.

## 2020-10-02 NOTE — TELEPHONE ENCOUNTER
----- Message from Donny Ayon sent at 10/2/2020 10:02 AM CDT -----  Type: Needs Medical Advice  Who Called:  Patient    Best Call Back Number: [phone]  Additional Information: Patient states that he would like a callback regarding his MRI and insurance approval.

## 2020-10-05 ENCOUNTER — TELEPHONE (OUTPATIENT)
Dept: ORTHOPEDICS | Facility: CLINIC | Age: 40
End: 2020-10-05

## 2020-10-05 NOTE — TELEPHONE ENCOUNTER
----- Message from Sarita Ramirez sent at 10/5/2020 10:54 AM CDT -----  Type: Needs Medical Advice  Who Called:  Yuko with Patient's employer  Best Call Back Number:   Additional Information: calling to speak with the nurse about the paperwork for patient's insurance that needs to be filed for his  MRI. They still have not received it and they need it done today.

## 2020-10-12 ENCOUNTER — OFFICE VISIT (OUTPATIENT)
Dept: ORTHOPEDICS | Facility: CLINIC | Age: 40
End: 2020-10-12
Payer: COMMERCIAL

## 2020-10-12 VITALS — WEIGHT: 182 LBS | HEIGHT: 66 IN | BODY MASS INDEX: 29.25 KG/M2

## 2020-10-12 DIAGNOSIS — M25.569 KNEE PAIN, UNSPECIFIED CHRONICITY, UNSPECIFIED LATERALITY: Primary | ICD-10-CM

## 2020-10-12 DIAGNOSIS — S83.242A ACUTE MEDIAL MENISCUS TEAR OF LEFT KNEE, INITIAL ENCOUNTER: ICD-10-CM

## 2020-10-12 DIAGNOSIS — Z01.818 PRE-OP EXAM: ICD-10-CM

## 2020-10-12 PROCEDURE — 99999 PR PBB SHADOW E&M-EST. PATIENT-LVL III: CPT | Mod: PBBFAC,,, | Performed by: ORTHOPAEDIC SURGERY

## 2020-10-12 PROCEDURE — 99999 PR PBB SHADOW E&M-EST. PATIENT-LVL III: ICD-10-PCS | Mod: PBBFAC,,, | Performed by: ORTHOPAEDIC SURGERY

## 2020-10-12 PROCEDURE — 99214 PR OFFICE/OUTPT VISIT, EST, LEVL IV, 30-39 MIN: ICD-10-PCS | Mod: 57,S$GLB,, | Performed by: ORTHOPAEDIC SURGERY

## 2020-10-12 PROCEDURE — 99214 OFFICE O/P EST MOD 30 MIN: CPT | Mod: 57,S$GLB,, | Performed by: ORTHOPAEDIC SURGERY

## 2020-10-12 PROCEDURE — 99213 OFFICE O/P EST LOW 20 MIN: CPT | Mod: PBBFAC,PN | Performed by: ORTHOPAEDIC SURGERY

## 2020-10-12 RX ORDER — SODIUM CHLORIDE 9 MG/ML
INJECTION, SOLUTION INTRAVENOUS CONTINUOUS
Status: CANCELLED | OUTPATIENT
Start: 2020-10-12

## 2020-10-12 NOTE — PROGRESS NOTES
40 years old with persistent pain in the left knee.  We got an MRI which confirmed our suspicion of a medial meniscal tear.  He has a complex tear of the posterior horn the medial meniscus.  He has been symptomatic now for over a month's time.  I think is reasonable to proceed with knee arthroscopy with partial meniscectomy.  Patient is aware of the risks and limitations of the surgery and still wants to proceed    Further History  Aching pain  Worse with activity  Relieved with rest  No other associated symptoms  No other radiation    Further Exam  Alert and oriented  Pleasant  Contralateral limb has appropriate range of motion for age and condition  Contralateral limb has appropriate strength for age and condition  Contralateral limb has appropriate stability  for age and condition  No adenopathy  Pulses are appropriate for current condition  Skin is intact        Chief Complaint    Chief Complaint   Patient presents with    Left Knee - Pain       HPI  Mat Cam is a 40 y.o.  male who presents with       Past Medical History  Past Medical History:   Diagnosis Date    Anemia 2001    aplastic anemia    Blood transfusion     Calcium oxalate renal stones     Diverticulitis 2011       Past Surgical History  Past Surgical History:   Procedure Laterality Date    procedure on eye muscles as child         Medications  Current Outpatient Medications   Medication Sig    ciclopirox (LOPROX) 0.77 % Crea Apply topically 2 (two) times daily. (Patient not taking: Reported on 12/29/2019)    clotrimazole-betamethasone 1-0.05% (LOTRISONE) cream Apply topically 2 (two) times daily. (Patient not taking: Reported on 8/10/2020)    loratadine (CLARITIN REDITABS) 10 mg dissolvable tablet Take 1 tablet (10 mg total) by mouth once daily.     No current facility-administered medications for this visit.        Allergies  Review of patient's allergies indicates:  No Known Allergies    Family History  Family History   Problem  Relation Age of Onset    Stroke Father        Social History  Social History     Socioeconomic History    Marital status:      Spouse name: Not on file    Number of children: Not on file    Years of education: Not on file    Highest education level: Not on file   Occupational History    Not on file   Social Needs    Financial resource strain: Not on file    Food insecurity     Worry: Not on file     Inability: Not on file    Transportation needs     Medical: Not on file     Non-medical: Not on file   Tobacco Use    Smoking status: Never Smoker    Smokeless tobacco: Never Used   Substance and Sexual Activity    Alcohol use: No     Alcohol/week: 0.0 standard drinks    Drug use: No    Sexual activity: Yes     Partners: Female   Lifestyle    Physical activity     Days per week: Not on file     Minutes per session: Not on file    Stress: Not on file   Relationships    Social connections     Talks on phone: Not on file     Gets together: Not on file     Attends Anabaptism service: Not on file     Active member of club or organization: Not on file     Attends meetings of clubs or organizations: Not on file     Relationship status: Not on file   Other Topics Concern    Not on file   Social History Narrative    Not on file               Review of Systems     Constitutional: Negative    HENT: Negative  Eyes: Negative  Respiratory: Negative  Cardiovascular: Negative  Musculoskeletal: HPI  Skin: Negative  Neurological: Negative  Hematological: Negative  Endocrine: Negative                 Physical Exam    There were no vitals filed for this visit.  Body mass index is 29.38 kg/m².  Physical Examination:     General appearance -  well appearing, and in no distress  Mental status - awake  Neck - supple  Chest -  symmetric air entry  Heart - normal rate   Abdomen - soft      Assessment     1. Knee pain, unspecified chronicity, unspecified laterality          Plan

## 2020-10-12 NOTE — H&P (VIEW-ONLY)
40 years old with persistent pain in the left knee.  We got an MRI which confirmed our suspicion of a medial meniscal tear.  He has a complex tear of the posterior horn the medial meniscus.  He has been symptomatic now for over a month's time.  I think is reasonable to proceed with knee arthroscopy with partial meniscectomy.  Patient is aware of the risks and limitations of the surgery and still wants to proceed    Further History  Aching pain  Worse with activity  Relieved with rest  No other associated symptoms  No other radiation    Further Exam  Alert and oriented  Pleasant  Contralateral limb has appropriate range of motion for age and condition  Contralateral limb has appropriate strength for age and condition  Contralateral limb has appropriate stability  for age and condition  No adenopathy  Pulses are appropriate for current condition  Skin is intact        Chief Complaint    Chief Complaint   Patient presents with    Left Knee - Pain       HPI  Mat Cam is a 40 y.o.  male who presents with       Past Medical History  Past Medical History:   Diagnosis Date    Anemia 2001    aplastic anemia    Blood transfusion     Calcium oxalate renal stones     Diverticulitis 2011       Past Surgical History  Past Surgical History:   Procedure Laterality Date    procedure on eye muscles as child         Medications  Current Outpatient Medications   Medication Sig    ciclopirox (LOPROX) 0.77 % Crea Apply topically 2 (two) times daily. (Patient not taking: Reported on 12/29/2019)    clotrimazole-betamethasone 1-0.05% (LOTRISONE) cream Apply topically 2 (two) times daily. (Patient not taking: Reported on 8/10/2020)    loratadine (CLARITIN REDITABS) 10 mg dissolvable tablet Take 1 tablet (10 mg total) by mouth once daily.     No current facility-administered medications for this visit.        Allergies  Review of patient's allergies indicates:  No Known Allergies    Family History  Family History   Problem  Relation Age of Onset    Stroke Father        Social History  Social History     Socioeconomic History    Marital status:      Spouse name: Not on file    Number of children: Not on file    Years of education: Not on file    Highest education level: Not on file   Occupational History    Not on file   Social Needs    Financial resource strain: Not on file    Food insecurity     Worry: Not on file     Inability: Not on file    Transportation needs     Medical: Not on file     Non-medical: Not on file   Tobacco Use    Smoking status: Never Smoker    Smokeless tobacco: Never Used   Substance and Sexual Activity    Alcohol use: No     Alcohol/week: 0.0 standard drinks    Drug use: No    Sexual activity: Yes     Partners: Female   Lifestyle    Physical activity     Days per week: Not on file     Minutes per session: Not on file    Stress: Not on file   Relationships    Social connections     Talks on phone: Not on file     Gets together: Not on file     Attends Caodaism service: Not on file     Active member of club or organization: Not on file     Attends meetings of clubs or organizations: Not on file     Relationship status: Not on file   Other Topics Concern    Not on file   Social History Narrative    Not on file               Review of Systems     Constitutional: Negative    HENT: Negative  Eyes: Negative  Respiratory: Negative  Cardiovascular: Negative  Musculoskeletal: HPI  Skin: Negative  Neurological: Negative  Hematological: Negative  Endocrine: Negative                 Physical Exam    There were no vitals filed for this visit.  Body mass index is 29.38 kg/m².  Physical Examination:     General appearance -  well appearing, and in no distress  Mental status - awake  Neck - supple  Chest -  symmetric air entry  Heart - normal rate   Abdomen - soft      Assessment     1. Knee pain, unspecified chronicity, unspecified laterality          Plan

## 2020-10-30 ENCOUNTER — TELEPHONE (OUTPATIENT)
Dept: ORTHOPEDICS | Facility: CLINIC | Age: 40
End: 2020-10-30

## 2020-10-30 ENCOUNTER — PATIENT MESSAGE (OUTPATIENT)
Dept: SURGERY | Facility: HOSPITAL | Age: 40
End: 2020-10-30

## 2020-10-30 NOTE — TELEPHONE ENCOUNTER
----- Message from Sparkle Gomez MA sent at 10/30/2020  1:07 PM CDT -----  Contact: pt  Dropped off paperwork   Callb ack

## 2020-11-02 ENCOUNTER — LAB VISIT (OUTPATIENT)
Dept: FAMILY MEDICINE | Facility: CLINIC | Age: 40
End: 2020-11-02
Payer: COMMERCIAL

## 2020-11-02 DIAGNOSIS — Z01.818 PRE-OP EXAM: ICD-10-CM

## 2020-11-02 PROCEDURE — U0003 INFECTIOUS AGENT DETECTION BY NUCLEIC ACID (DNA OR RNA); SEVERE ACUTE RESPIRATORY SYNDROME CORONAVIRUS 2 (SARS-COV-2) (CORONAVIRUS DISEASE [COVID-19]), AMPLIFIED PROBE TECHNIQUE, MAKING USE OF HIGH THROUGHPUT TECHNOLOGIES AS DESCRIBED BY CMS-2020-01-R: HCPCS

## 2020-11-03 ENCOUNTER — PATIENT MESSAGE (OUTPATIENT)
Dept: ORTHOPEDICS | Facility: CLINIC | Age: 40
End: 2020-11-03

## 2020-11-03 DIAGNOSIS — S83.242A ACUTE MEDIAL MENISCUS TEAR OF LEFT KNEE, INITIAL ENCOUNTER: Primary | ICD-10-CM

## 2020-11-03 LAB — SARS-COV-2 RNA RESP QL NAA+PROBE: NOT DETECTED

## 2020-11-04 ENCOUNTER — ANESTHESIA EVENT (OUTPATIENT)
Dept: SURGERY | Facility: HOSPITAL | Age: 40
End: 2020-11-04
Payer: COMMERCIAL

## 2020-11-04 RX ORDER — HYDROGEN PEROXIDE 3 %
20 SOLUTION, NON-ORAL MISCELLANEOUS DAILY PRN
COMMUNITY

## 2020-11-04 RX ORDER — OXYCODONE AND ACETAMINOPHEN 5; 325 MG/1; MG/1
1 TABLET ORAL
Qty: 42 TABLET | Refills: 0 | Status: SHIPPED | OUTPATIENT
Start: 2020-11-04

## 2020-11-05 ENCOUNTER — HOSPITAL ENCOUNTER (OUTPATIENT)
Facility: HOSPITAL | Age: 40
Discharge: HOME | End: 2020-11-05
Attending: ORTHOPAEDIC SURGERY | Admitting: ORTHOPAEDIC SURGERY
Payer: COMMERCIAL

## 2020-11-05 ENCOUNTER — ANESTHESIA (OUTPATIENT)
Dept: SURGERY | Facility: HOSPITAL | Age: 40
End: 2020-11-05
Payer: COMMERCIAL

## 2020-11-05 DIAGNOSIS — M25.569 KNEE PAIN, UNSPECIFIED CHRONICITY, UNSPECIFIED LATERALITY: ICD-10-CM

## 2020-11-05 DIAGNOSIS — S83.242A ACUTE MEDIAL MENISCUS TEAR OF LEFT KNEE, INITIAL ENCOUNTER: ICD-10-CM

## 2020-11-05 PROCEDURE — 25000003 PHARM REV CODE 250: Mod: PO | Performed by: NURSE ANESTHETIST, CERTIFIED REGISTERED

## 2020-11-05 PROCEDURE — 37000008 HC ANESTHESIA 1ST 15 MINUTES: Mod: PO | Performed by: ORTHOPAEDIC SURGERY

## 2020-11-05 PROCEDURE — 25000003 PHARM REV CODE 250: Mod: PO | Performed by: ANESTHESIOLOGY

## 2020-11-05 PROCEDURE — 29881 PR KNEE SCOPE SINGLE MENISECECTOMY: ICD-10-PCS | Mod: LT,,, | Performed by: ORTHOPAEDIC SURGERY

## 2020-11-05 PROCEDURE — 29881 ARTHRS KNE SRG MNISECTMY M/L: CPT | Mod: LT,,, | Performed by: ORTHOPAEDIC SURGERY

## 2020-11-05 PROCEDURE — 36000710: Mod: PO | Performed by: ORTHOPAEDIC SURGERY

## 2020-11-05 PROCEDURE — D9220A PRA ANESTHESIA: ICD-10-PCS | Mod: ,,, | Performed by: ANESTHESIOLOGY

## 2020-11-05 PROCEDURE — 27201423 OPTIME MED/SURG SUP & DEVICES STERILE SUPPLY: Mod: PO | Performed by: ORTHOPAEDIC SURGERY

## 2020-11-05 PROCEDURE — 71000015 HC POSTOP RECOV 1ST HR: Mod: PO | Performed by: ORTHOPAEDIC SURGERY

## 2020-11-05 PROCEDURE — 63600175 PHARM REV CODE 636 W HCPCS: Mod: PO | Performed by: ANESTHESIOLOGY

## 2020-11-05 PROCEDURE — 63600175 PHARM REV CODE 636 W HCPCS: Mod: PO | Performed by: ORTHOPAEDIC SURGERY

## 2020-11-05 PROCEDURE — 36000711: Mod: PO | Performed by: ORTHOPAEDIC SURGERY

## 2020-11-05 PROCEDURE — 71000033 HC RECOVERY, INTIAL HOUR: Mod: PO | Performed by: ORTHOPAEDIC SURGERY

## 2020-11-05 PROCEDURE — 37000009 HC ANESTHESIA EA ADD 15 MINS: Mod: PO | Performed by: ORTHOPAEDIC SURGERY

## 2020-11-05 PROCEDURE — 63600175 PHARM REV CODE 636 W HCPCS: Mod: PO | Performed by: NURSE ANESTHETIST, CERTIFIED REGISTERED

## 2020-11-05 PROCEDURE — D9220A PRA ANESTHESIA: Mod: ,,, | Performed by: ANESTHESIOLOGY

## 2020-11-05 PROCEDURE — 25000003 PHARM REV CODE 250: Mod: PO | Performed by: ORTHOPAEDIC SURGERY

## 2020-11-05 RX ORDER — FENTANYL CITRATE 50 UG/ML
INJECTION, SOLUTION INTRAMUSCULAR; INTRAVENOUS
Status: DISCONTINUED | OUTPATIENT
Start: 2020-11-05 | End: 2020-11-05

## 2020-11-05 RX ORDER — BUPIVACAINE HYDROCHLORIDE 2.5 MG/ML
INJECTION, SOLUTION EPIDURAL; INFILTRATION; INTRACAUDAL
Status: DISCONTINUED | OUTPATIENT
Start: 2020-11-05 | End: 2020-11-05 | Stop reason: HOSPADM

## 2020-11-05 RX ORDER — LIDOCAINE HYDROCHLORIDE 10 MG/ML
1 INJECTION, SOLUTION EPIDURAL; INFILTRATION; INTRACAUDAL; PERINEURAL ONCE
Status: COMPLETED | OUTPATIENT
Start: 2020-11-05 | End: 2020-11-05

## 2020-11-05 RX ORDER — SODIUM CHLORIDE, SODIUM LACTATE, POTASSIUM CHLORIDE, CALCIUM CHLORIDE 600; 310; 30; 20 MG/100ML; MG/100ML; MG/100ML; MG/100ML
INJECTION, SOLUTION INTRAVENOUS CONTINUOUS
Status: DISCONTINUED | OUTPATIENT
Start: 2020-11-05 | End: 2020-11-05 | Stop reason: HOSPADM

## 2020-11-05 RX ORDER — PROPOFOL 10 MG/ML
VIAL (ML) INTRAVENOUS
Status: DISCONTINUED | OUTPATIENT
Start: 2020-11-05 | End: 2020-11-05

## 2020-11-05 RX ORDER — EPINEPHRINE 1 MG/ML
INJECTION INTRAMUSCULAR; INTRAVENOUS; SUBCUTANEOUS
Status: DISCONTINUED | OUTPATIENT
Start: 2020-11-05 | End: 2020-11-05 | Stop reason: HOSPADM

## 2020-11-05 RX ORDER — KETAMINE HYDROCHLORIDE 100 MG/ML
INJECTION, SOLUTION INTRAMUSCULAR; INTRAVENOUS
Status: DISCONTINUED | OUTPATIENT
Start: 2020-11-05 | End: 2020-11-05

## 2020-11-05 RX ORDER — ONDANSETRON 2 MG/ML
INJECTION INTRAMUSCULAR; INTRAVENOUS
Status: DISCONTINUED | OUTPATIENT
Start: 2020-11-05 | End: 2020-11-05

## 2020-11-05 RX ORDER — OXYCODONE HYDROCHLORIDE 5 MG/1
5 TABLET ORAL ONCE AS NEEDED
Status: COMPLETED | OUTPATIENT
Start: 2020-11-05 | End: 2020-11-05

## 2020-11-05 RX ORDER — CEFAZOLIN SODIUM 2 G/50ML
2 SOLUTION INTRAVENOUS
Status: COMPLETED | OUTPATIENT
Start: 2020-11-05 | End: 2020-11-05

## 2020-11-05 RX ORDER — ONDANSETRON 2 MG/ML
4 INJECTION INTRAMUSCULAR; INTRAVENOUS ONCE AS NEEDED
Status: DISCONTINUED | OUTPATIENT
Start: 2020-11-05 | End: 2020-11-05 | Stop reason: HOSPADM

## 2020-11-05 RX ORDER — SODIUM CHLORIDE 9 MG/ML
INJECTION, SOLUTION INTRAVENOUS CONTINUOUS
Status: DISCONTINUED | OUTPATIENT
Start: 2020-11-05 | End: 2020-11-05 | Stop reason: HOSPADM

## 2020-11-05 RX ORDER — FENTANYL CITRATE 50 UG/ML
25 INJECTION, SOLUTION INTRAMUSCULAR; INTRAVENOUS EVERY 5 MIN PRN
Status: DISCONTINUED | OUTPATIENT
Start: 2020-11-05 | End: 2020-11-05 | Stop reason: HOSPADM

## 2020-11-05 RX ORDER — MIDAZOLAM HYDROCHLORIDE 1 MG/ML
INJECTION, SOLUTION INTRAMUSCULAR; INTRAVENOUS
Status: DISCONTINUED | OUTPATIENT
Start: 2020-11-05 | End: 2020-11-05

## 2020-11-05 RX ORDER — LIDOCAINE HYDROCHLORIDE 20 MG/ML
INJECTION INTRAVENOUS
Status: DISCONTINUED | OUTPATIENT
Start: 2020-11-05 | End: 2020-11-05

## 2020-11-05 RX ORDER — ACETAMINOPHEN 10 MG/ML
INJECTION, SOLUTION INTRAVENOUS
Status: DISCONTINUED | OUTPATIENT
Start: 2020-11-05 | End: 2020-11-05

## 2020-11-05 RX ADMIN — OXYCODONE HYDROCHLORIDE 5 MG: 5 TABLET ORAL at 09:11

## 2020-11-05 RX ADMIN — KETAMINE HYDROCHLORIDE 20 MG: 100 INJECTION, SOLUTION, CONCENTRATE INTRAMUSCULAR; INTRAVENOUS at 07:11

## 2020-11-05 RX ADMIN — LIDOCAINE HYDROCHLORIDE 100 MG: 20 INJECTION, SOLUTION INTRAVENOUS at 07:11

## 2020-11-05 RX ADMIN — CEFAZOLIN SODIUM 2 G: 2 SOLUTION INTRAVENOUS at 08:11

## 2020-11-05 RX ADMIN — MIDAZOLAM 2 MG: 1 INJECTION INTRAMUSCULAR; INTRAVENOUS at 07:11

## 2020-11-05 RX ADMIN — ONDANSETRON 4 MG: 2 INJECTION, SOLUTION INTRAMUSCULAR; INTRAVENOUS at 08:11

## 2020-11-05 RX ADMIN — LIDOCAINE HYDROCHLORIDE 10 MG: 10 INJECTION, SOLUTION EPIDURAL; INFILTRATION; INTRACAUDAL; PERINEURAL at 07:11

## 2020-11-05 RX ADMIN — PROPOFOL 200 MG: 10 INJECTION, EMULSION INTRAVENOUS at 07:11

## 2020-11-05 RX ADMIN — FENTANYL CITRATE 25 MCG: 50 INJECTION, SOLUTION INTRAMUSCULAR; INTRAVENOUS at 08:11

## 2020-11-05 RX ADMIN — SODIUM CHLORIDE, SODIUM LACTATE, POTASSIUM CHLORIDE, AND CALCIUM CHLORIDE: .6; .31; .03; .02 INJECTION, SOLUTION INTRAVENOUS at 07:11

## 2020-11-05 RX ADMIN — ACETAMINOPHEN 1000 MG: 10 INJECTION, SOLUTION INTRAVENOUS at 07:11

## 2020-11-05 NOTE — INTERVAL H&P NOTE
The patient has been examined and the H&P has been reviewed:    I concur with the findings and no changes have occurred since H&P was written.    Surgery risks, benefits and alternative options discussed and understood by patient/family.          Active Hospital Problems    Diagnosis  POA    Knee pain [M25.569]  Yes      Resolved Hospital Problems   No resolved problems to display.

## 2020-11-05 NOTE — ANESTHESIA PREPROCEDURE EVALUATION
11/05/2020  Mat Cam is a 40 y.o., male.    Anesthesia Evaluation    I have reviewed the Patient Summary Reports.    I have reviewed the Nursing Notes. I have reviewed the NPO Status.   I have reviewed the Medications.     Review of Systems  Anesthesia Hx:  No previous Anesthesia    Social:  Non-Smoker    Hematology/Oncology:         -- Anemia:   Cardiovascular:  Cardiovascular Normal     Pulmonary:   Sleep Apnea    Renal/:   Chronic Renal Disease renal calculi    Hepatic/GI:   GERD    Musculoskeletal:   Left knee meniscus tear    Neurological:  Neurology Normal    Endocrine:  Endocrine Normal    Dermatological:  Skin Normal    Psych:  Psychiatric Normal           Physical Exam  General:  Well nourished    Airway/Jaw/Neck:  Airway Findings: Mouth Opening: Normal Tongue: Normal  General Airway Assessment: Adult  Mallampati: II  TM Distance: Normal, at least 6 cm        Eyes/Ears/Nose:  EYES/EARS/NOSE FINDINGS: Normal   Dental:  Dental Findings: In tact   Chest/Lungs:  Chest/Lungs Findings: Clear to auscultation, Normal Respiratory Rate     Heart/Vascular:  Heart Findings: Rate: Normal  Rhythm: Regular Rhythm        Mental Status:  Mental Status Findings:  Cooperative, Alert and Oriented         Anesthesia Plan  Type of Anesthesia, risks & benefits discussed:  Anesthesia Type:  general  Patient's Preference:   Intra-op Monitoring Plan: standard ASA monitors  Intra-op Monitoring Plan Comments:   Post Op Pain Control Plan: multimodal analgesia and IV/PO Opioids PRN  Post Op Pain Control Plan Comments:   Induction:   IV  Beta Blocker:  Patient is not currently on a Beta-Blocker (No further documentation required).       Informed Consent: Patient understands risks and agrees with Anesthesia plan.  Questions answered. Anesthesia consent signed with patient.  ASA Score: 2     Day of Surgery Review of History  & Physical: I have interviewed and examined the patient. I have reviewed the patient's H&P dated:    H&P update referred to the surgeon.         Ready For Surgery From Anesthesia Perspective.

## 2020-11-05 NOTE — PLAN OF CARE
Patient awake, alert and oriented. Tolerating PO. Reports pain controlled. Left knee dressing clean, dry and intact. area without redness swelling or drainage. Toes warm, pink with brisk cap refill. Discharge instructions reviewed with patient. Patient verbalized understanding. Patient discharged home with family.

## 2020-11-05 NOTE — ANESTHESIA POSTPROCEDURE EVALUATION
Anesthesia Post Evaluation    Patient: Mat Cam    Procedure(s) Performed: Procedure(s) (LRB):  ARTHROSCOPY, KNEE, WITH MENISCECTOMY (Left)    Final Anesthesia Type: general    Patient location during evaluation: PACU  Patient participation: Yes- Able to Participate  Level of consciousness: awake and alert  Post-procedure vital signs: reviewed and stable  Pain management: adequate  Airway patency: patent    PONV status at discharge: No PONV  Anesthetic complications: no      Cardiovascular status: hemodynamically stable  Respiratory status: unassisted and room air  Hydration status: euvolemic  Follow-up not needed.          Vitals Value Taken Time   /79 11/05/20 0900   Temp 36.5 °C (97.7 °F) 11/05/20 0834   Pulse 74 11/05/20 0915   Resp 12 11/05/20 0934   SpO2 95 % 11/05/20 0900         No case tracking events are documented in the log.      Pain/Loulou Score: Pain Rating Prior to Med Admin: 4 (11/5/2020  9:34 AM)  Loulou Score: 10 (11/5/2020  9:00 AM)

## 2020-11-05 NOTE — DISCHARGE SUMMARY
Discharge Note  Short Stay      SUMMARY     Admit Date: 11/5/2020    Attending Physician: Michele Engle MD     Discharge Physician: Michele Engle MD    Discharge Date: 11/5/2020 8:42 AM    Final Diagnosis: Acute medial meniscus tear of left knee, initial encounter [S83.242A]    Hospital Course: Patient tolerated procedure well.     Disposition: Home or Self Care    Patient Instructions:   Current Discharge Medication List      CONTINUE these medications which have NOT CHANGED    Details   esomeprazole (NEXIUM) 20 MG capsule Take 20 mg by mouth daily as needed.      ciclopirox (LOPROX) 0.77 % Crea Apply topically 2 (two) times daily.  Qty: 90 g, Refills: 2      clotrimazole-betamethasone 1-0.05% (LOTRISONE) cream Apply topically 2 (two) times daily.  Qty: 15 g, Refills: 1    Associated Diagnoses: Tinea pedis of left foot      loratadine (CLARITIN REDITABS) 10 mg dissolvable tablet Take 1 tablet (10 mg total) by mouth once daily.  Qty: 30 tablet, Refills: 0    Associated Diagnoses: Chronic rhinitis      oxyCODONE-acetaminophen (PERCOCET) 5-325 mg per tablet Take 1 tablet by mouth every 4 to 6 hours as needed for Pain.  Qty: 42 tablet, Refills: 0    Comments: Quantity prescribed more than 7 day supply? Yes, quantity medically necessary  Associated Diagnoses: Acute medial meniscus tear of left knee, initial encounter             Discharge Procedure Orders (must include Diet, Follow-up, Activity):   Discharge Procedure Orders (must include Diet, Follow-up, Activity)   Ice to affected area     Keep surgical extremity elevated     Remove dressing in 48 hours     Activity as tolerated   Order Comments: Crutches for comfort        Follow Up:  Follow up as scheduled.  Resume routine diet.  Activity as tolerated.    No driving day of procedure.

## 2020-11-05 NOTE — BRIEF OP NOTE
[facility]  Brief Operative Note     SUMMARY     Surgery Date: 11/5/2020     Surgeon(s) and Role:     * Michele Engle MD - Primary    Assistant:  None    Pre-op Diagnosis:  Acute medial meniscus tear of left knee, initial encounter [S83.242A]    Post-op Diagnosis:  Acute medial meniscus tear of left knee, initial encounter [S83.242A]    Procedure(s) (LRB):  ARTHROSCOPY, KNEE, WITH MENISCECTOMY (Left)      Description of the findings of the procedure:  Acute medial meniscus tear of left knee, initial encounter [S83.242A]      Estimated Blood Loss: < 20CC         Specimens:   Specimen (12h ago, onward)    None          Discharge Note    SUMMARY     Admit Date: 11/5/2020    Discharge Date and Time: No discharge date for patient encounter.    Attending Physician: Michele Engle MD     Discharge Provider: Michele Engle    Final Diagnosis :Acute medial meniscus tear of left knee, initial encounter [S83.242A]    Outcome of Hospitalization, Treatment, Procedure, or Surgery:  Patient was admitted for an outpatient procedure and tolerated it well without complications.    Disposition: Home or Self care    Follow Up/Patient Instructions: The patient will be discharged home after meeting all discharge criteria. The patient will resume a diet as tolerated. Please follow post-operative instructions for activity restrictions. Keep previously planned post-operative follow -up appointment.      No discharge procedures on file.

## 2020-11-05 NOTE — TRANSFER OF CARE
"Anesthesia Transfer of Care Note    Patient: Mat Cam    Procedure(s) Performed: Procedure(s) (LRB):  ARTHROSCOPY, KNEE, WITH MENISCECTOMY (Left)    Patient location: PACU    Anesthesia Type: general    Transport from OR: Transported from OR on room air with adequate spontaneous ventilation    Post pain: adequate analgesia    Post assessment: no apparent anesthetic complications and tolerated procedure well    Post vital signs: stable    Level of consciousness: responds to stimulation    Nausea/Vomiting: no nausea/vomiting    Complications: none    Transfer of care protocol was followed      Last vitals:   Visit Vitals  /78 (BP Location: Right arm, Patient Position: Lying)   Pulse 63   Temp 36.4 °C (97.5 °F) (Skin)   Resp 16   Ht 5' 6" (1.676 m)   Wt 82.6 kg (182 lb)   SpO2 99%   BMI 29.38 kg/m²     "

## 2020-11-05 NOTE — OP NOTE
[facility]  Operative Note    SUMMARY     Date of Procedure: 11/5/2020     Pre-Operative Diagnosis: Acute medial meniscus tear of left knee, initial encounter [S83.242A]    Post-Operative Diagnosis: Post-Op Diagnosis Codes:     * Acute medial meniscus tear of left knee, initial encounter [S83.242A]    Procedure: Procedure(s) (LRB):  ARTHROSCOPY, KNEE, WITH MENISCECTOMY (Left)       Surgeon(s) and Role:     * Michele Engle MD - Primary    Indications for procedure:      Procedure in Detail:  After obtaining consent and starting the patient on preoperative IV antibiotics, patient was taken back to the operating room where general anesthesia was performed by the anesthesia team.  The left lower extremity was prepped and draped in normal sterile fashion.  After 10 min of elevation the pneumatic tourniquet was inflated to 300 mm Hg.  Standard inferior lateral portal stabbing arthroscope was introduced into the knee.  Patellofemoral compartment looked healthy with no chondral injury identified.  No loose bodies in lateral gutter.  Went down the medial compartment localize a medial portal spinal needle.  The medial portal medial compartment showed a bucket-handle tear with the medial meniscal fragment flipped into the notch.  Due to the age of the patient the chronicity of the tear and the overall configurations of the tear we opted for partial meniscectomy.  With the scissors removed the remaining connection of the bucket-handle tear and then with the arthroscopic grasper removed the torn meniscal fragment.  We took a shaver to smooth down the edges to smooth stable rim.  At least 50% of the meniscus remained.  Went to the notch ACL is intact.  Lateral compartment looked healthy with no meniscal or chondral pathology.  We then evacuated fluid is close for sites with for and on stitches injected knee with 0.25% Marcaine plain.  Sterile dressing applied tourniquet deflated, this concluded the  operative procedure    Anesthesia: General    Complications: No    Estimated Blood Loss (EBL): * No values recorded between 11/5/2020  8:09 AM and 11/5/2020  8:33 AM *           Implants: * No implants in log *    Specimens:   Specimen (12h ago, onward)    None

## 2020-11-05 NOTE — DISCHARGE INSTRUCTIONS
KNEE     DO'S   Keep leg elevated while at rest until return appointment.   Use crutches at all times.   Apply ice to knee next 2 days for 20 min intervals.  Remove ace 2 days after surgery, then shower , pat dry and place band aids    and  rewrap  knee with ace bandage.   Check circulation frequently in toes by pressing down on nail.  Nail should turn white then pink. NOT bluish gray    Crutches - for comfort. Activity as tolerated.  Follow up with Physical Therapy.      DON'T   DO Not soak in tub.   DO NOT TAKE ADDITIONAL TYLENOL/ACETAMINOPHEN WHILE TAKING   NARCOTIC PAIN MEDICATION.    CALL PHYSICIAN FOR:   Bleeding or signs of infection (redness, swelling, draining pus or warm to touch)   Fever greater than 101.   Persistent pain not relieved by pain medication.    RETURN APPOINTMENT AS INSTRUCTED  CALL [phone] for doctor.

## 2020-11-06 VITALS
DIASTOLIC BLOOD PRESSURE: 84 MMHG | RESPIRATION RATE: 12 BRPM | TEMPERATURE: 98 F | SYSTOLIC BLOOD PRESSURE: 133 MMHG | WEIGHT: 182 LBS | OXYGEN SATURATION: 97 % | BODY MASS INDEX: 29.25 KG/M2 | HEIGHT: 66 IN | HEART RATE: 72 BPM

## 2020-11-16 ENCOUNTER — TELEPHONE (OUTPATIENT)
Dept: ORTHOPEDICS | Facility: CLINIC | Age: 40
End: 2020-11-16

## 2020-11-16 ENCOUNTER — PATIENT MESSAGE (OUTPATIENT)
Dept: ORTHOPEDICS | Facility: CLINIC | Age: 40
End: 2020-11-16

## 2020-11-16 NOTE — TELEPHONE ENCOUNTER
----- Message from Sharath Long sent at 11/16/2020  9:33 AM CST -----  Regarding: needs a date corrected  on [employer] paperwork  Contact: pt   call

## 2020-11-19 ENCOUNTER — OFFICE VISIT (OUTPATIENT)
Dept: ORTHOPEDICS | Facility: CLINIC | Age: 40
End: 2020-11-19
Payer: COMMERCIAL

## 2020-11-19 ENCOUNTER — DOCUMENTATION ONLY (OUTPATIENT)
Dept: ORTHOPEDICS | Facility: CLINIC | Age: 40
End: 2020-11-19

## 2020-11-19 VITALS — HEIGHT: 66 IN | WEIGHT: 182 LBS | BODY MASS INDEX: 29.25 KG/M2

## 2020-11-19 DIAGNOSIS — S83.242A ACUTE MEDIAL MENISCUS TEAR OF LEFT KNEE, INITIAL ENCOUNTER: Primary | ICD-10-CM

## 2020-11-19 PROCEDURE — 99024 PR POST-OP FOLLOW-UP VISIT: ICD-10-PCS | Mod: S$GLB,,, | Performed by: ORTHOPAEDIC SURGERY

## 2020-11-19 PROCEDURE — 99024 POSTOP FOLLOW-UP VISIT: CPT | Mod: S$GLB,,, | Performed by: ORTHOPAEDIC SURGERY

## 2020-11-19 PROCEDURE — 99999 PR PBB SHADOW E&M-EST. PATIENT-LVL III: ICD-10-PCS | Mod: PBBFAC,,, | Performed by: ORTHOPAEDIC SURGERY

## 2020-11-19 PROCEDURE — 99999 PR PBB SHADOW E&M-EST. PATIENT-LVL III: CPT | Mod: PBBFAC,,, | Performed by: ORTHOPAEDIC SURGERY

## 2020-11-19 NOTE — PROGRESS NOTES
Sutures removed per Dr. Engle's orders. Incision clean, dry, and intact with no swelling, bleeding, redness, drainage, or foul odor. Understanding verbalized.

## 2020-11-19 NOTE — PROGRESS NOTES
2 weeks post arthroscopy.  Doing well - no complaints.  Wounds look good without signs of infection.  Sutures removed.  Patient was instructed to gradually return to activities to tolerance and encouraged to work to progressively strengthen the extremity over time.  Follow up as needed.

## 2022-08-16 ENCOUNTER — TELEPHONE (OUTPATIENT)
Dept: ORTHOPEDICS | Facility: CLINIC | Age: 42
End: 2022-08-16
Payer: COMMERCIAL

## 2022-08-16 NOTE — TELEPHONE ENCOUNTER
----- Message from Mary Buchanan sent at 8/16/2022  3:28 PM CDT -----  Contact: Ailyn with [employer]  Type: Needs Medical Advice    Who Called:  Ailyn Coleman called to ask if  you have received the forms that was faxed to your office yesterday      Best Call Back Number: [phone] ext [phone]    additional Information: Please call back to get more information

## 2022-08-16 NOTE — TELEPHONE ENCOUNTER
Attempted to contact Ailyn with [employer]. No answer. L VM to return call and informed Ailyn we didn't received any paper regarding Mat Cam.

## 2022-08-23 ENCOUNTER — TELEPHONE (OUTPATIENT)
Dept: ORTHOPEDICS | Facility: CLINIC | Age: 42
End: 2022-08-23
Payer: COMMERCIAL

## 2022-08-23 NOTE — TELEPHONE ENCOUNTER
----- Message from Jasmin Kleber sent at 8/23/2022  4:04 PM CDT -----  Regarding: [employer] called  Name of Who is Calling: FERMÍN PERALTA [[id number]] lisa([employer])      What is the request in detail: requesting the status to see if the forms were received for  the patient life insurance . Please advise       Can the clinic reply by [facility]: No      What Number to Call Back if not in [facility]: [phone] ext [phone] (ext number can be used as the case number)

## 2022-08-23 NOTE — TELEPHONE ENCOUNTER
Contacted patient. LVM to return call. Informed him through VM we received his paperwork and called us back for an appointment or the next step for [employer].

## 2022-08-24 ENCOUNTER — TELEPHONE (OUTPATIENT)
Dept: ORTHOPEDICS | Facility: CLINIC | Age: 42
End: 2022-08-24
Payer: COMMERCIAL

## 2022-08-24 NOTE — TELEPHONE ENCOUNTER
Contacted Amada. Informed her we did get in touch with Lisa from [employer] and informed lisa and Amada Engle doesn't fill out forms for Anemia. Mr. Cam pcp will need to contacted for the form to fill out. Patient verbalized understanding.

## 2022-08-24 NOTE — TELEPHONE ENCOUNTER
----- Message from Letitia Daniels LPN sent at 8/23/2022  4:45 PM CDT -----  Contact: patient    ----- Message -----  From: Jovanni Cotter MA  Sent: 8/23/2022   4:28 PM CDT  To: Oniel SALTER Staff    Patient called in and stated he was returning call to office.  Patient stated it was about some insurance paperwork?    Patient call back number is [phone] (wife-Amada)

## 2022-10-14 ENCOUNTER — TELEPHONE (OUTPATIENT)
Dept: SLEEP MEDICINE | Facility: CLINIC | Age: 42
End: 2022-10-14
Payer: COMMERCIAL

## 2022-10-14 NOTE — TELEPHONE ENCOUNTER
----- Message from Latoya Malloy sent at 10/14/2022 11:54 AM CDT -----  Regarding: patient call back  Type: Patient Call Back    Who called: Ashley with [employer]     What is the request in detail: Form to patients health info     Can the clinic reply by [facility]? No     Would the patient rather a call back or a response via [facility]? Call     Best call back number: [phone]   # [phone]

## 2022-10-14 NOTE — TELEPHONE ENCOUNTER
Left VM last seen 2016 by me not sure why I was sent the message or what paperwork she is talking about

## 2023-09-26 ENCOUNTER — LAB VISIT (OUTPATIENT)
Dept: LAB | Facility: HOSPITAL | Age: 43
End: 2023-09-26
Attending: INTERNAL MEDICINE
Payer: COMMERCIAL

## 2023-09-26 ENCOUNTER — OFFICE VISIT (OUTPATIENT)
Dept: INTERNAL MEDICINE | Facility: CLINIC | Age: 43
End: 2023-09-26
Payer: COMMERCIAL

## 2023-09-26 VITALS
SYSTOLIC BLOOD PRESSURE: 120 MMHG | DIASTOLIC BLOOD PRESSURE: 82 MMHG | OXYGEN SATURATION: 98 % | WEIGHT: 169.31 LBS | HEART RATE: 87 BPM | BODY MASS INDEX: 27.21 KG/M2 | HEIGHT: 66 IN

## 2023-09-26 DIAGNOSIS — Z87.442 HISTORY OF NEPHROLITHIASIS: ICD-10-CM

## 2023-09-26 DIAGNOSIS — N30.01 ACUTE CYSTITIS WITH HEMATURIA: Primary | ICD-10-CM

## 2023-09-26 DIAGNOSIS — Z86.2 HISTORY OF APLASTIC ANEMIA: ICD-10-CM

## 2023-09-26 DIAGNOSIS — B35.3 TINEA PEDIS OF LEFT FOOT: ICD-10-CM

## 2023-09-26 DIAGNOSIS — N30.01 ACUTE CYSTITIS WITH HEMATURIA: ICD-10-CM

## 2023-09-26 LAB
ALBUMIN SERPL BCP-MCNC: 4.4 G/DL (ref 3.5–5.2)
ALP SERPL-CCNC: 66 U/L (ref 55–135)
ALT SERPL W/O P-5'-P-CCNC: 29 U/L (ref 10–44)
ANION GAP SERPL CALC-SCNC: 9 MMOL/L (ref 8–16)
AST SERPL-CCNC: 24 U/L (ref 10–40)
BACTERIA #/AREA URNS AUTO: ABNORMAL /HPF
BASOPHILS # BLD AUTO: 0.02 K/UL (ref 0–0.2)
BASOPHILS NFR BLD: 0.2 % (ref 0–1.9)
BILIRUB SERPL-MCNC: 0.6 MG/DL (ref 0.1–1)
BILIRUB SERPL-MCNC: ABNORMAL MG/DL
BILIRUB UR QL STRIP: NEGATIVE
BLOOD URINE, POC: ABNORMAL
BUN SERPL-MCNC: 10 MG/DL (ref 6–20)
C TRACH DNA SPEC QL NAA+PROBE: NOT DETECTED
CALCIUM SERPL-MCNC: 9.7 MG/DL (ref 8.7–10.5)
CHLORIDE SERPL-SCNC: 103 MMOL/L (ref 95–110)
CLARITY UR REFRACT.AUTO: ABNORMAL
CLARITY, POC UA: ABNORMAL
CO2 SERPL-SCNC: 27 MMOL/L (ref 23–29)
COLOR UR AUTO: ABNORMAL
COLOR, POC UA: ABNORMAL
CREAT SERPL-MCNC: 1 MG/DL (ref 0.5–1.4)
DIFFERENTIAL METHOD: ABNORMAL
EOSINOPHIL # BLD AUTO: 0 K/UL (ref 0–0.5)
EOSINOPHIL NFR BLD: 0.3 % (ref 0–8)
ERYTHROCYTE [DISTWIDTH] IN BLOOD BY AUTOMATED COUNT: 11.6 % (ref 11.5–14.5)
EST. GFR  (NO RACE VARIABLE): >60 ML/MIN/1.73 M^2
GLUCOSE SERPL-MCNC: 102 MG/DL (ref 70–110)
GLUCOSE UR QL STRIP: ABNORMAL
GLUCOSE UR QL STRIP: NEGATIVE
HCT VFR BLD AUTO: 42.4 % (ref 40–54)
HGB BLD-MCNC: 14.2 G/DL (ref 14–18)
HGB UR QL STRIP: ABNORMAL
HYALINE CASTS UR QL AUTO: 0 /LPF
IMM GRANULOCYTES # BLD AUTO: 0.03 K/UL (ref 0–0.04)
IMM GRANULOCYTES NFR BLD AUTO: 0.3 % (ref 0–0.5)
KETONES UR QL STRIP: ABNORMAL
KETONES UR QL STRIP: NEGATIVE
LEUKOCYTE ESTERASE UR QL STRIP: ABNORMAL
LEUKOCYTE ESTERASE URINE, POC: ABNORMAL
LYMPHOCYTES # BLD AUTO: 1.1 K/UL (ref 1–4.8)
LYMPHOCYTES NFR BLD: 9.5 % (ref 18–48)
MCH RBC QN AUTO: 32.9 PG (ref 27–31)
MCHC RBC AUTO-ENTMCNC: 33.5 G/DL (ref 32–36)
MCV RBC AUTO: 98 FL (ref 82–98)
MICROSCOPIC COMMENT: ABNORMAL
MONOCYTES # BLD AUTO: 1.2 K/UL (ref 0.3–1)
MONOCYTES NFR BLD: 10.5 % (ref 4–15)
N GONORRHOEA DNA SPEC QL NAA+PROBE: NOT DETECTED
NEUTROPHILS # BLD AUTO: 9.1 K/UL (ref 1.8–7.7)
NEUTROPHILS NFR BLD: 79.2 % (ref 38–73)
NITRITE UR QL STRIP: NEGATIVE
NITRITE, POC UA: ABNORMAL
NRBC BLD-RTO: 0 /100 WBC
PH UR STRIP: 5 [PH] (ref 5–8)
PH, POC UA: 5
PLATELET # BLD AUTO: 275 K/UL (ref 150–450)
PMV BLD AUTO: 10.2 FL (ref 9.2–12.9)
POTASSIUM SERPL-SCNC: 4.5 MMOL/L (ref 3.5–5.1)
PROT SERPL-MCNC: 8.2 G/DL (ref 6–8.4)
PROT UR QL STRIP: ABNORMAL
PROTEIN, POC: ABNORMAL
RBC # BLD AUTO: 4.31 M/UL (ref 4.6–6.2)
RBC #/AREA URNS AUTO: 37 /HPF (ref 0–4)
SODIUM SERPL-SCNC: 139 MMOL/L (ref 136–145)
SP GR UR STRIP: 1.03 (ref 1–1.03)
SPECIFIC GRAVITY, POC UA: 1.02
SQUAMOUS #/AREA URNS AUTO: 0 /HPF
URN SPEC COLLECT METH UR: ABNORMAL
UROBILINOGEN, POC UA: ABNORMAL
WBC # BLD AUTO: 11.5 K/UL (ref 3.9–12.7)
WBC #/AREA URNS AUTO: >100 /HPF (ref 0–5)

## 2023-09-26 PROCEDURE — 85025 COMPLETE CBC W/AUTO DIFF WBC: CPT | Performed by: INTERNAL MEDICINE

## 2023-09-26 PROCEDURE — 87591 N.GONORRHOEAE DNA AMP PROB: CPT | Performed by: INTERNAL MEDICINE

## 2023-09-26 PROCEDURE — 36415 COLL VENOUS BLD VENIPUNCTURE: CPT | Performed by: INTERNAL MEDICINE

## 2023-09-26 PROCEDURE — 99999 PR PBB SHADOW E&M-EST. PATIENT-LVL III: CPT | Mod: PBBFAC,,, | Performed by: INTERNAL MEDICINE

## 2023-09-26 PROCEDURE — 87086 URINE CULTURE/COLONY COUNT: CPT | Performed by: INTERNAL MEDICINE

## 2023-09-26 PROCEDURE — 3079F PR MOST RECENT DIASTOLIC BLOOD PRESSURE 80-89 MM HG: ICD-10-PCS | Mod: CPTII,S$GLB,, | Performed by: INTERNAL MEDICINE

## 2023-09-26 PROCEDURE — 3074F PR MOST RECENT SYSTOLIC BLOOD PRESSURE < 130 MM HG: ICD-10-PCS | Mod: CPTII,S$GLB,, | Performed by: INTERNAL MEDICINE

## 2023-09-26 PROCEDURE — 1159F MED LIST DOCD IN RCRD: CPT | Mod: CPTII,S$GLB,, | Performed by: INTERNAL MEDICINE

## 2023-09-26 PROCEDURE — 3008F BODY MASS INDEX DOCD: CPT | Mod: CPTII,S$GLB,, | Performed by: INTERNAL MEDICINE

## 2023-09-26 PROCEDURE — 3074F SYST BP LT 130 MM HG: CPT | Mod: CPTII,S$GLB,, | Performed by: INTERNAL MEDICINE

## 2023-09-26 PROCEDURE — 3079F DIAST BP 80-89 MM HG: CPT | Mod: CPTII,S$GLB,, | Performed by: INTERNAL MEDICINE

## 2023-09-26 PROCEDURE — 87186 SC STD MICRODIL/AGAR DIL: CPT | Performed by: INTERNAL MEDICINE

## 2023-09-26 PROCEDURE — 1159F PR MEDICATION LIST DOCUMENTED IN MEDICAL RECORD: ICD-10-PCS | Mod: CPTII,S$GLB,, | Performed by: INTERNAL MEDICINE

## 2023-09-26 PROCEDURE — 81001 URINALYSIS AUTO W/SCOPE: CPT | Performed by: INTERNAL MEDICINE

## 2023-09-26 PROCEDURE — 80053 COMPREHEN METABOLIC PANEL: CPT | Performed by: INTERNAL MEDICINE

## 2023-09-26 PROCEDURE — 99999 PR PBB SHADOW E&M-EST. PATIENT-LVL III: ICD-10-PCS | Mod: PBBFAC,,, | Performed by: INTERNAL MEDICINE

## 2023-09-26 PROCEDURE — 99214 PR OFFICE/OUTPT VISIT, EST, LEVL IV, 30-39 MIN: ICD-10-PCS | Mod: S$GLB,,, | Performed by: INTERNAL MEDICINE

## 2023-09-26 PROCEDURE — 87088 URINE BACTERIA CULTURE: CPT | Performed by: INTERNAL MEDICINE

## 2023-09-26 PROCEDURE — 99214 OFFICE O/P EST MOD 30 MIN: CPT | Mod: S$GLB,,, | Performed by: INTERNAL MEDICINE

## 2023-09-26 PROCEDURE — 87077 CULTURE AEROBIC IDENTIFY: CPT | Performed by: INTERNAL MEDICINE

## 2023-09-26 PROCEDURE — 81002 POCT URINE DIPSTICK WITHOUT MICROSCOPE: ICD-10-PCS | Mod: S$GLB,,, | Performed by: INTERNAL MEDICINE

## 2023-09-26 PROCEDURE — 81002 URINALYSIS NONAUTO W/O SCOPE: CPT | Mod: S$GLB,,, | Performed by: INTERNAL MEDICINE

## 2023-09-26 PROCEDURE — 3008F PR BODY MASS INDEX (BMI) DOCUMENTED: ICD-10-PCS | Mod: CPTII,S$GLB,, | Performed by: INTERNAL MEDICINE

## 2023-09-26 RX ORDER — CIPROFLOXACIN 250 MG/1
250 TABLET, FILM COATED ORAL EVERY 12 HOURS
Qty: 10 TABLET | Refills: 0 | Status: SHIPPED | OUTPATIENT
Start: 2023-09-26

## 2023-09-26 RX ORDER — CLOTRIMAZOLE AND BETAMETHASONE DIPROPIONATE 10; .64 MG/G; MG/G
CREAM TOPICAL 2 TIMES DAILY
Qty: 15 G | Refills: 1 | Status: SHIPPED | OUTPATIENT
Start: 2023-09-26

## 2023-09-26 NOTE — PROGRESS NOTES
Subjective:       Patient ID: Mat Cam is a 43 y.o. male.    Chief Complaint: Urinary Tract Infection      HPI  Mat Cam is a 43 y.o. year old male with dysuria and hematuria on going x 3 days. Denies new partners. Last sexual intercourse 3 days ago. States he feels that he first noticed symptoms while showering on Friday. Denies fever, chills, flank pain, nausea, vomiting, changes to bowel habitus. States he does have a history of kidney stones previously, states this does not feel like he passed a kidney stone.     Review of Systems   Constitutional:  Negative for activity change, appetite change, chills, fatigue, fever and unexpected weight change.   HENT:  Negative for congestion, rhinorrhea and sore throat.    Eyes:  Negative for visual disturbance.   Respiratory:  Negative for shortness of breath.    Cardiovascular:  Negative for chest pain.   Gastrointestinal:  Negative for abdominal pain, diarrhea, nausea and vomiting.   Genitourinary:  Positive for dysuria and hematuria. Negative for difficulty urinating.   Musculoskeletal:  Negative for arthralgias, back pain and myalgias.   Skin:  Negative for color change and rash.   Neurological:  Negative for dizziness, weakness and headaches.         Past Medical History:   Diagnosis Date    Anemia 2001    aplastic anemia    Blood transfusion     Calcium oxalate renal stones     Diverticulitis 2011    GERD (gastroesophageal reflux disease)         Prior to Admission medications    Medication Sig Start Date End Date Taking? Authorizing Provider   ciclopirox (LOPROX) 0.77 % Crea Apply topically 2 (two) times daily. 12/17/19  Yes Jer Membreno DPM   clotrimazole-betamethasone 1-0.05% (LOTRISONE) cream Apply topically 2 (two) times daily. 7/20/20  Yes Pranav Miranda NP   esomeprazole (NEXIUM) 20 MG capsule Take 20 mg by mouth daily as needed.   Yes Provider, Historical   ciprofloxacin HCl (CIPRO) 250 MG tablet Take 1 tablet (250 mg total) by mouth  "every 12 (twelve) hours. 9/26/23   Wali Zuleta MD   loratadine (CLARITIN REDITABS) 10 mg dissolvable tablet Take 1 tablet (10 mg total) by mouth once daily. 12/29/19 1/28/20  Kirby Alberto PA-C   oxyCODONE-acetaminophen (PERCOCET) 5-325 mg per tablet Take 1 tablet by mouth every 4 to 6 hours as needed for Pain.  Patient not taking: Reported on 9/26/2023 11/4/20   Michele Engle MD        Past medical history, surgical history, and family medical history reviewed and updated as appropriate.    Medications and allergies reviewed.     Objective:          Vitals:    09/26/23 0907   BP: 120/82   BP Location: Right arm   Patient Position: Sitting   Pulse: 87   SpO2: 98%   Weight: 76.8 kg (169 lb 5 oz)   Height: 5' 6" (1.676 m)     Body mass index is 27.33 kg/m².  Physical Exam  Constitutional:       General: He is not in acute distress.     Appearance: He is well-developed.   HENT:      Head: Normocephalic and atraumatic.      Nose: Nose normal.   Eyes:      General: No scleral icterus.     Extraocular Movements: Extraocular movements intact.   Neck:      Thyroid: No thyromegaly.      Vascular: No JVD.      Trachea: No tracheal deviation.   Cardiovascular:      Rate and Rhythm: Normal rate and regular rhythm.      Heart sounds: Normal heart sounds. No murmur heard.     No friction rub. No gallop.   Pulmonary:      Effort: Pulmonary effort is normal. No respiratory distress.      Breath sounds: Normal breath sounds. No wheezing or rales.   Abdominal:      General: Bowel sounds are normal. There is no distension.      Palpations: Abdomen is soft. There is no mass.      Tenderness: There is no abdominal tenderness.   Musculoskeletal:         General: No tenderness. Normal range of motion.      Cervical back: Normal range of motion and neck supple.   Lymphadenopathy:      Cervical: No cervical adenopathy.   Skin:     General: Skin is warm and dry.      Findings: No rash.   Neurological:      Mental Status: He " is alert and oriented to person, place, and time.      Cranial Nerves: No cranial nerve deficit.      Deep Tendon Reflexes: Reflexes normal.   Psychiatric:         Behavior: Behavior normal.         Lab Results   Component Value Date    WBC 6.16 02/27/2019    HGB 15.1 02/27/2019    HCT 43.9 02/27/2019     02/27/2019    CHOL 154 03/13/2013    TRIG 65 03/13/2013    HDL 58 03/13/2013    ALT 24 03/13/2013    AST 20 03/13/2013     03/13/2013    K 4.9 03/13/2013     03/13/2013    CREATININE 1.0 03/13/2013    BUN 17 03/13/2013    CO2 25 03/13/2013    TSH 1.401 03/13/2013       Assessment:       1. Acute cystitis with hematuria    2. History of nephrolithiasis    3. History of aplastic anemia          Plan:     Mat was seen today for urinary tract infection.    Diagnoses and all orders for this visit:    Acute cystitis with hematuria  -     POCT URINE DIPSTICK WITHOUT MICROSCOPE  -     Urinalysis, Reflex to Urine Culture Urine, Clean Catch  -     C. trachomatis/N. gonorrhoeae by AMP [facility]; Urine  -     CBC W/ AUTO DIFFERENTIAL; Future  -     COMPREHENSIVE METABOLIC PANEL; Future  -     ciprofloxacin HCl (CIPRO) 250 MG tablet; Take 1 tablet (250 mg total) by mouth every 12 (twelve) hours.    History of nephrolithiasis    History of aplastic anemia  Comments:  year 2000, working in [place], working on chemical barges  Orders:  -     CBC W/ AUTO DIFFERENTIAL; Future  -     COMPREHENSIVE METABOLIC PANEL; Future    Suspect he may have unknowingly passed a small renal calculi.   Start cipro  Check labwork, urine, urine culture.     Health maintenance reviewed with patient.    Follow up if symptoms worsen or fail to improve.    Wali Zuleta MD  Internal Medicine / Primary Care  [facility]  9/26/2023

## 2023-09-26 NOTE — PATIENT INSTRUCTIONS
Urine today  Labwork today  Start ciprofloxacin 250 mg twice a day for 5 days.   Return to clinic as needed.

## 2023-09-29 LAB — BACTERIA UR CULT: ABNORMAL

## 2024-09-17 ENCOUNTER — OFFICE VISIT (OUTPATIENT)
Dept: FAMILY MEDICINE | Facility: CLINIC | Age: 44
End: 2024-09-17
Payer: COMMERCIAL

## 2024-09-17 ENCOUNTER — LAB VISIT (OUTPATIENT)
Dept: LAB | Facility: HOSPITAL | Age: 44
End: 2024-09-17
Attending: FAMILY MEDICINE
Payer: COMMERCIAL

## 2024-09-17 VITALS
SYSTOLIC BLOOD PRESSURE: 130 MMHG | DIASTOLIC BLOOD PRESSURE: 84 MMHG | WEIGHT: 179 LBS | BODY MASS INDEX: 28.77 KG/M2 | HEART RATE: 77 BPM | HEIGHT: 66 IN | OXYGEN SATURATION: 98 %

## 2024-09-17 DIAGNOSIS — R53.83 FATIGUE, UNSPECIFIED TYPE: ICD-10-CM

## 2024-09-17 DIAGNOSIS — B35.3 TINEA PEDIS OF BOTH FEET: ICD-10-CM

## 2024-09-17 DIAGNOSIS — Z00.00 WELLNESS EXAMINATION: Primary | ICD-10-CM

## 2024-09-17 DIAGNOSIS — Z00.00 WELLNESS EXAMINATION: ICD-10-CM

## 2024-09-17 DIAGNOSIS — L30.9 ECZEMA, UNSPECIFIED TYPE: ICD-10-CM

## 2024-09-17 LAB
ALBUMIN SERPL BCP-MCNC: 4.5 G/DL (ref 3.5–5.2)
ALP SERPL-CCNC: 55 U/L (ref 55–135)
ALT SERPL W/O P-5'-P-CCNC: 33 U/L (ref 10–44)
ANION GAP SERPL CALC-SCNC: 8 MMOL/L (ref 8–16)
AST SERPL-CCNC: 24 U/L (ref 10–40)
BASOPHILS # BLD AUTO: 0.01 K/UL (ref 0–0.2)
BASOPHILS NFR BLD: 0.2 % (ref 0–1.9)
BILIRUB SERPL-MCNC: 0.5 MG/DL (ref 0.1–1)
BUN SERPL-MCNC: 12 MG/DL (ref 6–20)
CALCIUM SERPL-MCNC: 9.4 MG/DL (ref 8.7–10.5)
CHLORIDE SERPL-SCNC: 106 MMOL/L (ref 95–110)
CHOLEST SERPL-MCNC: 191 MG/DL (ref 120–199)
CHOLEST/HDLC SERPL: 2.9 {RATIO} (ref 2–5)
CO2 SERPL-SCNC: 25 MMOL/L (ref 23–29)
COMPLEXED PSA SERPL-MCNC: 0.75 NG/ML (ref 0–4)
CREAT SERPL-MCNC: 0.9 MG/DL (ref 0.5–1.4)
DIFFERENTIAL METHOD BLD: ABNORMAL
EOSINOPHIL # BLD AUTO: 0.2 K/UL (ref 0–0.5)
EOSINOPHIL NFR BLD: 3.2 % (ref 0–8)
ERYTHROCYTE [DISTWIDTH] IN BLOOD BY AUTOMATED COUNT: 12.5 % (ref 11.5–14.5)
EST. GFR  (NO RACE VARIABLE): >60 ML/MIN/1.73 M^2
ESTIMATED AVG GLUCOSE: 100 MG/DL (ref 68–131)
GLUCOSE SERPL-MCNC: 101 MG/DL (ref 70–110)
HBA1C MFR BLD: 5.1 % (ref 4–5.6)
HCT VFR BLD AUTO: 44.3 % (ref 40–54)
HCV AB SERPL QL IA: NORMAL
HDLC SERPL-MCNC: 65 MG/DL (ref 40–75)
HDLC SERPL: 34 % (ref 20–50)
HGB BLD-MCNC: 15.1 G/DL (ref 14–18)
HIV 1+2 AB+HIV1 P24 AG SERPL QL IA: NORMAL
IMM GRANULOCYTES # BLD AUTO: 0.01 K/UL (ref 0–0.04)
IMM GRANULOCYTES NFR BLD AUTO: 0.2 % (ref 0–0.5)
LDLC SERPL CALC-MCNC: 107.6 MG/DL (ref 63–159)
LYMPHOCYTES # BLD AUTO: 1.7 K/UL (ref 1–4.8)
LYMPHOCYTES NFR BLD: 30.9 % (ref 18–48)
MCH RBC QN AUTO: 33.7 PG (ref 27–31)
MCHC RBC AUTO-ENTMCNC: 34.1 G/DL (ref 32–36)
MCV RBC AUTO: 99 FL (ref 82–98)
MONOCYTES # BLD AUTO: 0.6 K/UL (ref 0.3–1)
MONOCYTES NFR BLD: 9.8 % (ref 4–15)
NEUTROPHILS # BLD AUTO: 3.1 K/UL (ref 1.8–7.7)
NEUTROPHILS NFR BLD: 55.7 % (ref 38–73)
NONHDLC SERPL-MCNC: 126 MG/DL
NRBC BLD-RTO: 0 /100 WBC
PLATELET # BLD AUTO: 222 K/UL (ref 150–450)
PMV BLD AUTO: 10.2 FL (ref 9.2–12.9)
POTASSIUM SERPL-SCNC: 4.5 MMOL/L (ref 3.5–5.1)
PROT SERPL-MCNC: 7.9 G/DL (ref 6–8.4)
RBC # BLD AUTO: 4.48 M/UL (ref 4.6–6.2)
SODIUM SERPL-SCNC: 139 MMOL/L (ref 136–145)
TESTOST SERPL-MCNC: 283 NG/DL (ref 304–1227)
TRIGL SERPL-MCNC: 92 MG/DL (ref 30–150)
TSH SERPL DL<=0.005 MIU/L-ACNC: 1.84 UIU/ML (ref 0.4–4)
WBC # BLD AUTO: 5.6 K/UL (ref 3.9–12.7)

## 2024-09-17 PROCEDURE — 80061 LIPID PANEL: CPT | Performed by: FAMILY MEDICINE

## 2024-09-17 PROCEDURE — 3079F DIAST BP 80-89 MM HG: CPT | Mod: CPTII,S$GLB,, | Performed by: FAMILY MEDICINE

## 2024-09-17 PROCEDURE — 85025 COMPLETE CBC W/AUTO DIFF WBC: CPT | Performed by: FAMILY MEDICINE

## 2024-09-17 PROCEDURE — 99213 OFFICE O/P EST LOW 20 MIN: CPT | Mod: 25,S$GLB,, | Performed by: FAMILY MEDICINE

## 2024-09-17 PROCEDURE — 3008F BODY MASS INDEX DOCD: CPT | Mod: CPTII,S$GLB,, | Performed by: FAMILY MEDICINE

## 2024-09-17 PROCEDURE — 1159F MED LIST DOCD IN RCRD: CPT | Mod: CPTII,S$GLB,, | Performed by: FAMILY MEDICINE

## 2024-09-17 PROCEDURE — 84443 ASSAY THYROID STIM HORMONE: CPT | Performed by: FAMILY MEDICINE

## 2024-09-17 PROCEDURE — 99396 PREV VISIT EST AGE 40-64: CPT | Mod: S$GLB,,, | Performed by: FAMILY MEDICINE

## 2024-09-17 PROCEDURE — 36415 COLL VENOUS BLD VENIPUNCTURE: CPT | Mod: PO | Performed by: FAMILY MEDICINE

## 2024-09-17 PROCEDURE — 87389 HIV-1 AG W/HIV-1&-2 AB AG IA: CPT | Performed by: FAMILY MEDICINE

## 2024-09-17 PROCEDURE — 3075F SYST BP GE 130 - 139MM HG: CPT | Mod: CPTII,S$GLB,, | Performed by: FAMILY MEDICINE

## 2024-09-17 PROCEDURE — 83036 HEMOGLOBIN GLYCOSYLATED A1C: CPT | Performed by: FAMILY MEDICINE

## 2024-09-17 PROCEDURE — 99999 PR PBB SHADOW E&M-EST. PATIENT-LVL III: CPT | Mod: PBBFAC,,, | Performed by: FAMILY MEDICINE

## 2024-09-17 PROCEDURE — 1160F RVW MEDS BY RX/DR IN RCRD: CPT | Mod: CPTII,S$GLB,, | Performed by: FAMILY MEDICINE

## 2024-09-17 PROCEDURE — 86803 HEPATITIS C AB TEST: CPT | Performed by: FAMILY MEDICINE

## 2024-09-17 PROCEDURE — 84403 ASSAY OF TOTAL TESTOSTERONE: CPT | Performed by: FAMILY MEDICINE

## 2024-09-17 PROCEDURE — 80053 COMPREHEN METABOLIC PANEL: CPT | Performed by: FAMILY MEDICINE

## 2024-09-17 PROCEDURE — 84153 ASSAY OF PSA TOTAL: CPT | Performed by: FAMILY MEDICINE

## 2024-09-17 RX ORDER — OMEPRAZOLE 10 MG/1
10 CAPSULE, DELAYED RELEASE ORAL DAILY
COMMUNITY

## 2024-09-17 RX ORDER — TRIAMCINOLONE ACETONIDE 1 MG/G
CREAM TOPICAL
Qty: 454 G | Refills: 2 | Status: SHIPPED | OUTPATIENT
Start: 2024-09-17

## 2024-09-17 RX ORDER — CLOTRIMAZOLE AND BETAMETHASONE DIPROPIONATE 10; .64 MG/G; MG/G
CREAM TOPICAL
Qty: 45 G | Refills: 2 | Status: SHIPPED | OUTPATIENT
Start: 2024-09-17 | End: 2024-09-17

## 2024-09-17 NOTE — PATIENT INSTRUCTIONS
Ask your pharmacist about the tetanus vaccine Tdap a flu shot, and completion of the COVID-19 vaccine series if you choose.

## 2024-09-17 NOTE — PROGRESS NOTES
"Subjective:       Patient ID: Mat Cam is a 44 y.o. male.    Chief Complaint: Rash (On back and abdomen, peeling feet)    HPI:  Pleasant 44-year-old patient here today to establish care.  He has got a rash in his feet consistent with tenia pedis and also on his sides consistent with eczema.  Appropriate steroid and focal combinations prescribed.  Health maintenance was discussed.  Labs have been ordered.  The patient has been experiencing some fatigue and has no mood disturbance at this time.  We will check TSH testosterone pros for pus.  Let us see him back in 1 year    Review of Systems   Constitutional: Negative.    HENT: Negative.     Eyes: Negative.    Respiratory: Negative.     Cardiovascular: Negative.    Gastrointestinal: Negative.    Endocrine: Negative.    Genitourinary: Negative.    Musculoskeletal: Negative.    Skin: Negative.    Allergic/Immunologic: Negative.    Neurological: Negative.    Hematological: Negative.    Psychiatric/Behavioral: Negative.         Objective:      Vitals:    09/17/24 0807   BP: 130/84   Pulse: 77   SpO2: 98%   Weight: 81.2 kg (179 lb 0.2 oz)   Height: 5' 6" (1.676 m)      Physical Exam  Vitals and nursing note reviewed.   Constitutional:       Appearance: Normal appearance. He is obese.   HENT:      Head: Normocephalic and atraumatic.      Nose: Nose normal.      Mouth/Throat:      Mouth: Mucous membranes are moist.      Pharynx: Oropharynx is clear.   Eyes:      Extraocular Movements: Extraocular movements intact.      Conjunctiva/sclera: Conjunctivae normal.      Pupils: Pupils are equal, round, and reactive to light.   Cardiovascular:      Rate and Rhythm: Normal rate and regular rhythm.   Pulmonary:      Effort: Pulmonary effort is normal.      Breath sounds: Normal breath sounds.   Abdominal:      General: Abdomen is flat. Bowel sounds are normal.      Palpations: Abdomen is soft.   Musculoskeletal:         General: Normal range of motion.      Cervical back: Normal " range of motion and neck supple.   Skin:     General: Skin is warm and dry.      Capillary Refill: Capillary refill takes less than 2 seconds.   Neurological:      General: No focal deficit present.      Mental Status: He is alert and oriented to person, place, and time.   Psychiatric:         Mood and Affect: Mood normal.         Behavior: Behavior normal.         Thought Content: Thought content normal.         Judgment: Judgment normal.         Results for orders placed or performed in visit on 09/26/23   CBC W/ AUTO DIFFERENTIAL   Result Value Ref Range    WBC 11.50 3.90 - 12.70 K/uL    RBC 4.31 (L) 4.60 - 6.20 M/uL    Hemoglobin 14.2 14.0 - 18.0 g/dL    Hematocrit 42.4 40.0 - 54.0 %    MCV 98 82 - 98 fL    MCH 32.9 (H) 27.0 - 31.0 pg    MCHC 33.5 32.0 - 36.0 g/dL    RDW 11.6 11.5 - 14.5 %    Platelets 275 150 - 450 K/uL    MPV 10.2 9.2 - 12.9 fL    Immature Granulocytes 0.3 0.0 - 0.5 %    Gran # (ANC) 9.1 (H) 1.8 - 7.7 K/uL    Immature Grans (Abs) 0.03 0.00 - 0.04 K/uL    Lymph # 1.1 1.0 - 4.8 K/uL    Mono # 1.2 (H) 0.3 - 1.0 K/uL    Eos # 0.0 0.0 - 0.5 K/uL    Baso # 0.02 0.00 - 0.20 K/uL    nRBC 0 0 /100 WBC    Gran % 79.2 (H) 38.0 - 73.0 %    Lymph % 9.5 (L) 18.0 - 48.0 %    Mono % 10.5 4.0 - 15.0 %    Eosinophil % 0.3 0.0 - 8.0 %    Basophil % 0.2 0.0 - 1.9 %    Differential Method Automated    COMPREHENSIVE METABOLIC PANEL   Result Value Ref Range    Sodium 139 136 - 145 mmol/L    Potassium 4.5 3.5 - 5.1 mmol/L    Chloride 103 95 - 110 mmol/L    CO2 27 23 - 29 mmol/L    Glucose 102 70 - 110 mg/dL    BUN 10 6 - 20 mg/dL    Creatinine 1.0 0.5 - 1.4 mg/dL    Calcium 9.7 8.7 - 10.5 mg/dL    Total Protein 8.2 6.0 - 8.4 g/dL    Albumin 4.4 3.5 - 5.2 g/dL    Total Bilirubin 0.6 0.1 - 1.0 mg/dL    Alkaline Phosphatase 66 55 - 135 U/L    AST 24 10 - 40 U/L    ALT 29 10 - 44 U/L    eGFR >60.0 >60 mL/min/1.73 m^2    Anion Gap 9 8 - 16 mmol/L      Assessment:       1. Wellness examination    2. Tinea pedis of both  feet    3. Eczema, unspecified type        Plan:       Wellness examination  -     Fecal Immunochemical Test (iFOBT); Future; Expected date: 09/17/2024  -     Hemoglobin A1C; Future; Expected date: 09/17/2024  -     CBC Auto Differential; Future; Expected date: 09/17/2024  -     Comprehensive Metabolic Panel; Future; Expected date: 09/17/2024  -     Hepatitis C Antibody; Future; Expected date: 09/17/2024  -     HIV 1/2 Ag/Ab (4th Gen); Future; Expected date: 09/17/2024  -     Lipid Panel; Future; Expected date: 09/17/2024  -     PSA, Screening; Future; Expected date: 09/17/2024    Tinea pedis of both feet  -     Discontinue: clotrimazole-betamethasone 1-0.05% (LOTRISONE) cream; Apply b.i.d. to your feet for the fungus between your toes.  Dispense: 45 g; Refill: 2    Eczema, unspecified type  -     triamcinolone acetonide 0.1% (KENALOG) 0.1 % cream; Apply b.i.d. to your sides in other areas of the body for eczema.  Dispense: 454 g; Refill: 2          Medication List with Changes/Refills   New Medications    TRIAMCINOLONE ACETONIDE 0.1% (KENALOG) 0.1 % CREAM    Apply b.i.d. to your sides in other areas of the body for eczema.   Current Medications    CLOTRIMAZOLE-BETAMETHASONE 1-0.05% (LOTRISONE) CREAM    Apply topically 2 (two) times daily.    LORATADINE (CLARITIN REDITABS) 10 MG DISSOLVABLE TABLET    Take 1 tablet (10 mg total) by mouth once daily.    OMEPRAZOLE (PRILOSEC) 10 MG CAPSULE    Take 10 mg by mouth once daily.   Discontinued Medications    CICLOPIROX (LOPROX) 0.77 % CREA    Apply topically 2 (two) times daily.    CIPROFLOXACIN HCL (CIPRO) 250 MG TABLET    Take 1 tablet (250 mg total) by mouth every 12 (twelve) hours.    ESOMEPRAZOLE (NEXIUM) 20 MG CAPSULE    Take 20 mg by mouth daily as needed.    OXYCODONE-ACETAMINOPHEN (PERCOCET) 5-325 MG PER TABLET    Take 1 tablet by mouth every 4 to 6 hours as needed for Pain.

## 2024-09-18 ENCOUNTER — PATIENT MESSAGE (OUTPATIENT)
Dept: FAMILY MEDICINE | Facility: CLINIC | Age: 44
End: 2024-09-18
Payer: COMMERCIAL

## 2024-09-18 ENCOUNTER — LAB VISIT (OUTPATIENT)
Dept: LAB | Facility: HOSPITAL | Age: 44
End: 2024-09-18
Attending: FAMILY MEDICINE
Payer: COMMERCIAL

## 2024-09-18 DIAGNOSIS — E29.1 HYPOGONADISM IN MALE: Primary | ICD-10-CM

## 2024-09-18 DIAGNOSIS — Z00.00 WELLNESS EXAMINATION: ICD-10-CM

## 2024-09-18 PROCEDURE — 82274 ASSAY TEST FOR BLOOD FECAL: CPT | Performed by: FAMILY MEDICINE

## 2024-09-24 ENCOUNTER — LAB VISIT (OUTPATIENT)
Dept: LAB | Facility: HOSPITAL | Age: 44
End: 2024-09-24
Attending: FAMILY MEDICINE
Payer: COMMERCIAL

## 2024-09-24 ENCOUNTER — PATIENT MESSAGE (OUTPATIENT)
Dept: FAMILY MEDICINE | Facility: CLINIC | Age: 44
End: 2024-09-24
Payer: COMMERCIAL

## 2024-09-24 DIAGNOSIS — E29.1 HYPOGONADISM IN MALE: ICD-10-CM

## 2024-09-24 DIAGNOSIS — E29.1 HYPOGONADISM IN MALE: Primary | ICD-10-CM

## 2024-09-24 LAB
HEMOCCULT STL QL IA: NEGATIVE
TESTOST SERPL-MCNC: 300 NG/DL (ref 304–1227)

## 2024-09-24 PROCEDURE — 36415 COLL VENOUS BLD VENIPUNCTURE: CPT | Mod: PO | Performed by: FAMILY MEDICINE

## 2024-09-24 PROCEDURE — 84403 ASSAY OF TOTAL TESTOSTERONE: CPT | Performed by: FAMILY MEDICINE

## 2024-10-01 ENCOUNTER — TELEPHONE (OUTPATIENT)
Dept: UROLOGY | Facility: CLINIC | Age: 44
End: 2024-10-01
Payer: COMMERCIAL

## 2024-10-03 ENCOUNTER — OFFICE VISIT (OUTPATIENT)
Dept: UROLOGY | Facility: CLINIC | Age: 44
End: 2024-10-03
Payer: COMMERCIAL

## 2024-10-03 VITALS — WEIGHT: 183.44 LBS | HEIGHT: 66 IN | BODY MASS INDEX: 29.48 KG/M2

## 2024-10-03 DIAGNOSIS — R79.89 LOW TESTOSTERONE: Primary | ICD-10-CM

## 2024-10-03 DIAGNOSIS — E29.1 HYPOGONADISM IN MALE: ICD-10-CM

## 2024-10-03 PROCEDURE — 3008F BODY MASS INDEX DOCD: CPT | Mod: CPTII,S$GLB,,

## 2024-10-03 PROCEDURE — 99999 PR PBB SHADOW E&M-EST. PATIENT-LVL III: CPT | Mod: PBBFAC,,,

## 2024-10-03 PROCEDURE — 1160F RVW MEDS BY RX/DR IN RCRD: CPT | Mod: CPTII,S$GLB,,

## 2024-10-03 PROCEDURE — 1159F MED LIST DOCD IN RCRD: CPT | Mod: CPTII,S$GLB,,

## 2024-10-03 PROCEDURE — 99203 OFFICE O/P NEW LOW 30 MIN: CPT | Mod: S$GLB,,,

## 2024-10-03 PROCEDURE — 3044F HG A1C LEVEL LT 7.0%: CPT | Mod: CPTII,S$GLB,,

## 2024-10-03 RX ORDER — TESTOSTERONE CYPIONATE 200 MG/ML
200 INJECTION, SOLUTION INTRAMUSCULAR
Qty: 10 ML | Refills: 1 | Status: SHIPPED | OUTPATIENT
Start: 2024-10-03 | End: 2025-07-10

## 2024-10-03 NOTE — PROGRESS NOTES
[facility] Note  Staff: SAMM Clifton    PCP: MD Cal    Chief Complaint: Low T    Subjective:        HPI: Mat Cam is a 44 y.o. male NEW PATIENT presents today for low testosterone. He complains of fatigue and low motivation. His testosterone labs are from 9/17/2024 at 283 and 9/24/2024 at 300. We discussed TRT risks, SE, and benefits. We discussed treatment options including topical gel and IM injections. He states his wife could give him injections at home. He wishes to pursue a trial of IM injections. He denies dysuria, hematuria, fever, flank pain, and difficulty urinating.     Questions asked the pt during ov today:  Urgency: No, urge incontinence? No  Dysuria: No  Gross Hematuria:No  Straining:No, Hesistancy:No, Intermittency:No}, Weak stream:No      Last PSA Screening:   Lab Results   Component Value Date    PSA 0.75 09/17/2024       History of Kidney Stones?:  No    Constipation issues?:  No    REVIEW OF SYSTEMS:  Review of Systems   Constitutional:  Positive for malaise/fatigue. Negative for chills and fever.   Gastrointestinal: Negative.  Negative for abdominal pain, constipation, diarrhea, nausea and vomiting.   Genitourinary: Negative.  Negative for dysuria, flank pain, frequency, hematuria and urgency.   Musculoskeletal: Negative.  Negative for back pain.       PMHx:  Past Medical History:   Diagnosis Date    Anemia 2001    aplastic anemia    Blood transfusion     Calcium oxalate renal stones     Diverticulitis 2011    GERD (gastroesophageal reflux disease)        PSHx:  Past Surgical History:   Procedure Laterality Date    COLONOSCOPY      KNEE ARTHROSCOPY W/ MENISCECTOMY Left 11/5/2020    Procedure: ARTHROSCOPY, KNEE, WITH MENISCECTOMY;  Surgeon: Michele Engle MD;  Location: [facility] OR;  Service: Orthopedics;  Laterality: Left;    procedure on eye muscles as child         Fam Hx:   malignancies: No    kidney stones: No     Soc Hx:  , lives in  [place]    Allergies:  Patient has no known allergies.    Medications: reviewed     Objective:   There were no vitals filed for this visit.    Physical Exam  Constitutional:       Appearance: Normal appearance.   Pulmonary:      Effort: Pulmonary effort is normal.   Abdominal:      General: There is no distension.      Palpations: Abdomen is soft.      Tenderness: There is no abdominal tenderness.   Musculoskeletal:         General: Normal range of motion.      Cervical back: Normal range of motion.   Skin:     General: Skin is warm.   Neurological:      Mental Status: He is alert and oriented to person, place, and time.   Psychiatric:         Mood and Affect: Mood normal.         Behavior: Behavior normal.             Assessment:       1. Low testosterone    2. Hypogonadism in male          Plan:     TRT prescribed 200mg IM every 14 days  Update labs in 3 months- CBC and testosterone ordered    F/u 3 months with labs prior    [facility]: Active    SAMM Clifton

## 2025-01-03 ENCOUNTER — LAB VISIT (OUTPATIENT)
Dept: LAB | Facility: HOSPITAL | Age: 45
End: 2025-01-03
Payer: COMMERCIAL

## 2025-01-03 DIAGNOSIS — E29.1 HYPOGONADISM IN MALE: ICD-10-CM

## 2025-01-03 DIAGNOSIS — R79.89 LOW TESTOSTERONE: ICD-10-CM

## 2025-01-03 LAB
BASOPHILS # BLD AUTO: 0.02 K/UL (ref 0–0.2)
BASOPHILS NFR BLD: 0.3 % (ref 0–1.9)
DIFFERENTIAL METHOD BLD: ABNORMAL
EOSINOPHIL # BLD AUTO: 0.2 K/UL (ref 0–0.5)
EOSINOPHIL NFR BLD: 3.1 % (ref 0–8)
ERYTHROCYTE [DISTWIDTH] IN BLOOD BY AUTOMATED COUNT: 11.8 % (ref 11.5–14.5)
HCT VFR BLD AUTO: 44.3 % (ref 40–54)
HGB BLD-MCNC: 15.3 G/DL (ref 14–18)
IMM GRANULOCYTES # BLD AUTO: 0.02 K/UL (ref 0–0.04)
IMM GRANULOCYTES NFR BLD AUTO: 0.3 % (ref 0–0.5)
LYMPHOCYTES # BLD AUTO: 2.2 K/UL (ref 1–4.8)
LYMPHOCYTES NFR BLD: 29.6 % (ref 18–48)
MCH RBC QN AUTO: 32.8 PG (ref 27–31)
MCHC RBC AUTO-ENTMCNC: 34.5 G/DL (ref 32–36)
MCV RBC AUTO: 95 FL (ref 82–98)
MONOCYTES # BLD AUTO: 0.7 K/UL (ref 0.3–1)
MONOCYTES NFR BLD: 9.1 % (ref 4–15)
NEUTROPHILS # BLD AUTO: 4.3 K/UL (ref 1.8–7.7)
NEUTROPHILS NFR BLD: 57.6 % (ref 38–73)
NRBC BLD-RTO: 0 /100 WBC
PLATELET # BLD AUTO: 264 K/UL (ref 150–450)
PMV BLD AUTO: 9.9 FL (ref 9.2–12.9)
RBC # BLD AUTO: 4.66 M/UL (ref 4.6–6.2)
TESTOST SERPL-MCNC: 245 NG/DL (ref 304–1227)
WBC # BLD AUTO: 7.49 K/UL (ref 3.9–12.7)

## 2025-01-03 PROCEDURE — 36415 COLL VENOUS BLD VENIPUNCTURE: CPT | Mod: PO

## 2025-01-03 PROCEDURE — 84403 ASSAY OF TOTAL TESTOSTERONE: CPT

## 2025-01-03 PROCEDURE — 85025 COMPLETE CBC W/AUTO DIFF WBC: CPT

## 2025-01-06 ENCOUNTER — OFFICE VISIT (OUTPATIENT)
Dept: UROLOGY | Facility: CLINIC | Age: 45
End: 2025-01-06
Payer: COMMERCIAL

## 2025-01-06 DIAGNOSIS — E29.1 HYPOGONADISM MALE: ICD-10-CM

## 2025-01-06 DIAGNOSIS — R79.89 LOW TESTOSTERONE: Primary | ICD-10-CM

## 2025-01-06 DIAGNOSIS — L30.9 ECZEMA, UNSPECIFIED TYPE: ICD-10-CM

## 2025-01-06 RX ORDER — TESTOSTERONE CYPIONATE 200 MG/ML
200 INJECTION, SOLUTION INTRAMUSCULAR
Qty: 10 ML | Refills: 3 | Status: SHIPPED | OUTPATIENT
Start: 2025-01-06 | End: 2025-10-13

## 2025-01-06 NOTE — TELEPHONE ENCOUNTER
No care due was identified.  [facility] Embedded Care Due Messages. Reference number: [id number].   1/06/2025 4:59:25 PM CST

## 2025-01-06 NOTE — PROGRESS NOTES
The patient location is: [place]  The chief complaint leading to consultation is: low T f/u    Visit type: audiovisual    Face to Face time with patient: 10 minutes  15 minutes minutes of total time spent on the encounter, which includes face to face time and non-face to face time preparing to see the patient (eg, review of tests), Obtaining and/or reviewing separately obtained history, Documenting clinical information in the electronic or other health record, Independently interpreting results (not separately reported) and communicating results to the patient/family/caregiver, or Care coordination (not separately reported).         Each patient to whom he or she provides medical services by telemedicine is:  (1) informed of the relationship between the physician and patient and the respective role of any other health care provider with respect to management of the patient; and (2) notified that he or she may decline to receive medical services by telemedicine and may withdraw from such care at any time.    Notes:     [facility] Note  Staff: SAMM Clifton    PCP: MD Cal    Chief Complaint: Low T F/U    Subjective:        HPI: Mat Cam is a 44 y.o. male presents today for low T follow up. He is currently on TRT 200mg IM every 14 days. He states his wife injects him at home without any difficulty. He states he initially will feel good but then feels the affects wear off after about 1 week. His most recent testosterone is from 1/3/2025 at 245. This was a mid cycle testosterone. He denies any new or worsening urinary complaints. He denies any SE. We discussed adjusting TRT to 200mg IM every 7 days as a trial and he agrees to this.     Questions asked the pt during ov today:  Urgency: no, urge incontinence? no  Dysuria: No  Gross Hematuria:No  Straining:No, Hesistancy:No, Intermittency:No}, Weak stream:No    Last PSA Screening:   Lab Results   Component Value Date    PSA 0.75  09/17/2024         REVIEW OF SYSTEMS:  Review of Systems   Constitutional:  Positive for malaise/fatigue.   Gastrointestinal: Negative.  Negative for abdominal pain, constipation, diarrhea, nausea and vomiting.   Genitourinary: Negative.  Negative for dysuria, flank pain, frequency, hematuria and urgency.       PMHx:  Past Medical History:   Diagnosis Date    Anemia 2001    aplastic anemia    Blood transfusion     Calcium oxalate renal stones     Diverticulitis 2011    GERD (gastroesophageal reflux disease)        PSHx:  Past Surgical History:   Procedure Laterality Date    COLONOSCOPY      KNEE ARTHROSCOPY W/ MENISCECTOMY Left 11/5/2020    Procedure: ARTHROSCOPY, KNEE, WITH MENISCECTOMY;  Surgeon: Michele Engle MD;  Location: [facility];  Service: Orthopedics;  Laterality: Left;    procedure on eye muscles as child         Fam Hx:   malignancies: No    kidney stones: No     Soc Hx:  , lives in [place]    Allergies:  Patient has no known allergies.    Medications: reviewed     Objective:   There were no vitals filed for this visit.    Physical Exam  Constitutional:       Appearance: Normal appearance.   Pulmonary:      Effort: Pulmonary effort is normal.   Neurological:      Mental Status: He is oriented to person, place, and time.   Psychiatric:         Mood and Affect: Mood normal.         Behavior: Behavior normal.           Assessment:       1. Low testosterone    2. Hypogonadism male          Plan:     Update TRT to 200mg IM every 7 days  Repeat testosterone in 2 months    F/u 2 months with labs prior- virtual    [facility]: active    SAMM Clifton

## 2025-01-07 RX ORDER — TRIAMCINOLONE ACETONIDE 1 MG/G
CREAM TOPICAL
Qty: 454 G | Refills: 1 | Status: SHIPPED | OUTPATIENT
Start: 2025-01-07

## 2025-01-07 NOTE — TELEPHONE ENCOUNTER
Refill Decision Note   Mat Cam  is requesting a refill authorization.  Brief Assessment and Rationale for Refill:  Approve     Medication Therapy Plan:         Comments:     Note composed:11:38 AM 01/07/2025

## 2025-03-06 ENCOUNTER — LAB VISIT (OUTPATIENT)
Dept: LAB | Facility: HOSPITAL | Age: 45
End: 2025-03-06
Payer: COMMERCIAL

## 2025-03-06 DIAGNOSIS — E29.1 HYPOGONADISM MALE: ICD-10-CM

## 2025-03-06 DIAGNOSIS — R79.89 LOW TESTOSTERONE: ICD-10-CM

## 2025-03-06 LAB — TESTOST SERPL-MCNC: 759 NG/DL (ref 304–1227)

## 2025-03-06 PROCEDURE — 36415 COLL VENOUS BLD VENIPUNCTURE: CPT | Mod: PO

## 2025-03-06 PROCEDURE — 84403 ASSAY OF TOTAL TESTOSTERONE: CPT

## 2025-03-12 ENCOUNTER — RESULTS FOLLOW-UP (OUTPATIENT)
Dept: UROLOGY | Facility: CLINIC | Age: 45
End: 2025-03-12

## 2025-03-12 ENCOUNTER — TELEPHONE (OUTPATIENT)
Dept: UROLOGY | Facility: CLINIC | Age: 45
End: 2025-03-12
Payer: COMMERCIAL

## 2025-03-12 DIAGNOSIS — R79.89 LOW TESTOSTERONE: Primary | ICD-10-CM

## 2025-03-12 NOTE — TELEPHONE ENCOUNTER
Called and spoke with pt, also got pt scheduled for repeat lab work in 2 months. QR----- Message from Alyse Christianson NP sent at 3/10/2025  5:17 PM CDT -----  Regarding: FW:   Testosterone good number. Is he feeling improvement with weekly injections?  ----- Message -----  From: [facility]  Sent: 3/6/2025   3:02 PM CDT  To: Alyse Christianson NP

## 2025-05-13 ENCOUNTER — RESULTS FOLLOW-UP (OUTPATIENT)
Dept: UROLOGY | Facility: CLINIC | Age: 45
End: 2025-05-13

## 2025-05-13 ENCOUNTER — LAB VISIT (OUTPATIENT)
Dept: LAB | Facility: HOSPITAL | Age: 45
End: 2025-05-13
Payer: COMMERCIAL

## 2025-05-13 DIAGNOSIS — R79.89 LOW TESTOSTERONE: ICD-10-CM

## 2025-05-13 LAB — TESTOST SERPL-MCNC: 1448 NG/DL (ref 304–1227)

## 2025-05-13 PROCEDURE — 84403 ASSAY OF TOTAL TESTOSTERONE: CPT

## 2025-05-13 PROCEDURE — 36415 COLL VENOUS BLD VENIPUNCTURE: CPT | Mod: PO

## 2025-05-24 DIAGNOSIS — E29.1 HYPOGONADISM MALE: ICD-10-CM

## 2025-05-24 DIAGNOSIS — R79.89 LOW TESTOSTERONE: ICD-10-CM

## 2025-05-26 RX ORDER — TESTOSTERONE CYPIONATE 200 MG/ML
200 INJECTION, SOLUTION INTRAMUSCULAR
Qty: 10 ML | Refills: 3 | Status: SHIPPED | OUTPATIENT
Start: 2025-05-26 | End: 2026-03-02

## 2025-06-17 ENCOUNTER — OFFICE VISIT (OUTPATIENT)
Dept: INTERNAL MEDICINE | Facility: CLINIC | Age: 45
End: 2025-06-17
Payer: COMMERCIAL

## 2025-06-17 ENCOUNTER — TELEPHONE (OUTPATIENT)
Dept: INTERNAL MEDICINE | Facility: CLINIC | Age: 45
End: 2025-06-17

## 2025-06-17 VITALS
OXYGEN SATURATION: 97 % | HEIGHT: 66 IN | RESPIRATION RATE: 18 BRPM | BODY MASS INDEX: 29.55 KG/M2 | HEART RATE: 77 BPM | TEMPERATURE: 97 F | DIASTOLIC BLOOD PRESSURE: 90 MMHG | WEIGHT: 183.88 LBS | SYSTOLIC BLOOD PRESSURE: 136 MMHG

## 2025-06-17 DIAGNOSIS — K21.9 GASTROESOPHAGEAL REFLUX DISEASE, UNSPECIFIED WHETHER ESOPHAGITIS PRESENT: ICD-10-CM

## 2025-06-17 DIAGNOSIS — H92.02 LEFT EAR PAIN: ICD-10-CM

## 2025-06-17 DIAGNOSIS — H66.90 OTITIS MEDIA, UNSPECIFIED LATERALITY, UNSPECIFIED OTITIS MEDIA TYPE: Primary | ICD-10-CM

## 2025-06-17 PROCEDURE — 1160F RVW MEDS BY RX/DR IN RCRD: CPT | Mod: CPTII,S$GLB,,

## 2025-06-17 PROCEDURE — 3080F DIAST BP >= 90 MM HG: CPT | Mod: CPTII,S$GLB,,

## 2025-06-17 PROCEDURE — 3008F BODY MASS INDEX DOCD: CPT | Mod: CPTII,S$GLB,,

## 2025-06-17 PROCEDURE — 3075F SYST BP GE 130 - 139MM HG: CPT | Mod: CPTII,S$GLB,,

## 2025-06-17 PROCEDURE — 1159F MED LIST DOCD IN RCRD: CPT | Mod: CPTII,S$GLB,,

## 2025-06-17 PROCEDURE — 99214 OFFICE O/P EST MOD 30 MIN: CPT | Mod: S$GLB,,,

## 2025-06-17 PROCEDURE — 99999 PR PBB SHADOW E&M-EST. PATIENT-LVL IV: CPT | Mod: PBBFAC,,,

## 2025-06-17 RX ORDER — AMOXICILLIN 875 MG/1
875 TABLET, COATED ORAL EVERY 12 HOURS
Qty: 10 TABLET | Refills: 0 | Status: SHIPPED | OUTPATIENT
Start: 2025-06-17 | End: 2025-06-22
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
## 2025-06-17 NOTE — PROGRESS NOTES
"Mat Cam  1980        Subjective     Chief Complaint: Otalgia (Left ear pain)      History of Present Illness:  Mr. Mat Cam is a 45 y.o. male who presents to clinic for ear pain        History of Present Illness    CHIEF COMPLAINT:  Mr. Cam presents today for left ear pain.    HISTORY OF PRESENT ILLNESS:  He reports left ear pain that started yesterday and is progressively worsening. He initially experienced a "foggy" sensation in the ear. The pain is particularly notable when swallowing, with pain radiating from the ear down the back of the throat. He denies hearing difficulty on the affected side. He reports brown mucus production and post-nasal drip. He underwent a nasal procedure with cartilage cauterization approximately two weeks ago for chronic sinus problems, after which he completed a 5-day course of steroids and antibiotics.    MEDICAL HISTORY:  He has a history of chronic sinus problems and TMJ with associated jaw popping symptoms.    MEDICATIONS:  He takes Zyrtec for allergies and Nexium for heartburn.      ROS:  Constitutional: -dizziness  Head: -headaches  Eyes: -vision changes  ENT: +ear pain, +nasal congestion, +post nasal drip  Respiratory: -shortness of breath, +productive cough  Cardiovascular: -chest pain  Gastrointestinal: +heartburn           PAST HISTORY:     Past Medical History:   Diagnosis Date    Anemia 2001    aplastic anemia    Blood transfusion     Calcium oxalate renal stones     Diverticulitis 2011    GERD (gastroesophageal reflux disease)        Past Surgical History:   Procedure Laterality Date    COLONOSCOPY      KNEE ARTHROSCOPY W/ MENISCECTOMY Left 11/5/2020    Procedure: ARTHROSCOPY, KNEE, WITH MENISCECTOMY;  Surgeon: Michele Engle MD;  Location: [facility];  Service: Orthopedics;  Laterality: Left;    procedure on eye muscles as child         Family History   Problem Relation Name Age of Onset    Stroke Father         Social History     Socioeconomic " History    Marital status:    Tobacco Use    Smoking status: Never    Smokeless tobacco: Never   Substance and Sexual Activity    Alcohol use: No     Alcohol/week: 0.0 standard drinks of alcohol    Drug use: No    Sexual activity: Yes     Partners: Female     Social Drivers of Health     Financial Resource Strain: Low Risk  (1/5/2025)    Overall Financial Resource Strain (CARDIA)     Difficulty of Paying Living Expenses: Not very hard   Food Insecurity: No Food Insecurity (1/5/2025)    Hunger Vital Sign     Worried About Running Out of Food in the Last Year: Never true     Ran Out of Food in the Last Year: Never true   Physical Activity: Sufficiently Active (1/5/2025)    Exercise Vital Sign     Days of Exercise per Week: 5 days     Minutes of Exercise per Session: 60 min   Stress: No Stress Concern Present (1/5/2025)    [employer] [place] [employer] - Occupational Stress Questionnaire     Feeling of Stress : Not at all   Housing Stability: Unknown (1/5/2025)    Housing Stability Vital Sign     Unable to Pay for Housing in the Last Year: No       MEDICATIONS & ALLERGIES:     Current Outpatient Medications on File Prior to Visit   Medication Sig    testosterone cypionate (DEPOTESTOTERONE CYPIONATE) 200 mg/mL injection Inject 1 mL (200 mg total) into the muscle every 7 days.    loratadine (CLARITIN REDITABS) 10 mg dissolvable tablet Take 1 tablet (10 mg total) by mouth once daily.    omeprazole (PRILOSEC) 10 MG capsule Take 10 mg by mouth once daily. (Patient not taking: Reported on 6/17/2025)    triamcinolone acetonide 0.1% (KENALOG) 0.1 % cream Apply b.i.d. to your sides in other areas of the body for eczema. (Patient not taking: Reported on 6/17/2025)     No current facility-administered medications on file prior to visit.       Review of patient's allergies indicates:  No Known Allergies    OBJECTIVE:     Vital Signs:  Vitals:    06/17/25 1004 06/17/25 1042   BP: (!) 148/100 (!) 136/90   BP  "Location: Right arm    Patient Position: Sitting    Pulse: 77    Resp: 18    Temp: 97.4 °F (36.3 °C)    TempSrc: Temporal    SpO2: 97%    Weight: 83.4 kg (183 lb 13.8 oz)    Height: 5' 6" (1.676 m)        Body mass index is 29.68 kg/m².     Physical Exam:  Physical Exam  Vitals and nursing note reviewed.   Constitutional:       General: He is not in acute distress.     Appearance: He is not ill-appearing.   HENT:      Head: Normocephalic and atraumatic.      Right Ear: Tympanic membrane, ear canal and external ear normal.      Left Ear: Ear canal and external ear normal. Tympanic membrane is bulging.      Mouth/Throat:      Mouth: Mucous membranes are moist.      Pharynx: Oropharynx is clear. Posterior oropharyngeal erythema present. No oropharyngeal exudate.   Eyes:      Extraocular Movements: Extraocular movements intact.      Conjunctiva/sclera: Conjunctivae normal.   Cardiovascular:      Rate and Rhythm: Normal rate and regular rhythm.   Pulmonary:      Effort: Pulmonary effort is normal. No respiratory distress.      Breath sounds: Normal breath sounds. No wheezing or rales.   Chest:      Chest wall: No tenderness.   Abdominal:      Palpations: Abdomen is soft.      Tenderness: There is no abdominal tenderness. There is no guarding.   Musculoskeletal:         General: Normal range of motion.      Cervical back: Normal range of motion and neck supple. No tenderness.      Right lower leg: No edema.      Left lower leg: No edema.   Lymphadenopathy:      Cervical: No cervical adenopathy.   Skin:     General: Skin is warm and dry.   Neurological:      Mental Status: He is alert and oriented to person, place, and time.            Laboratory  Lab Results   Component Value Date    WBC 7.49 01/03/2025    HGB 15.3 01/03/2025    HCT 44.3 01/03/2025    MCV 95 01/03/2025     01/03/2025     Lab Results   Component Value Date     09/17/2024     09/17/2024    K 4.5 09/17/2024     09/17/2024    CO2 25 " "09/17/2024    BUN 12 09/17/2024    CREATININE 0.9 09/17/2024    CALCIUM 9.4 09/17/2024     No results found for: "INR", "PROTIME"  Lab Results   Component Value Date    HGBA1C 5.1 09/17/2024     No results for input(s): "POCTGLUCOSE" in the last 72 hours.      Health Maintenance         Date Due Completion Date    TETANUS VACCINE Never done ---    COVID-19 Vaccine (1 - 2024-25 season) Never done ---    Influenza Vaccine (Season Ended) 09/01/2025 9/17/2019    Colorectal Cancer Screening 09/18/2025 9/18/2024    Hemoglobin A1c (Diabetic Prevention Screening) 09/17/2027 9/17/2024    Lipid Panel 09/17/2029 9/17/2024    RSV Vaccine (Age 60+ and Pregnant patients) (1 - 1-dose 75+ series) 02/28/2054 ---            ASSESSMENT & PLAN:   45 y.o. male who was seen today in clinic for otitis media    Otitis media, unspecified laterality, unspecified otitis media type  -     amoxicillin (AMOXIL) 875 MG tablet; Take 1 tablet (875 mg total) by mouth every 12 (twelve) hours. for 5 days  Dispense: 10 tablet; Refill: 0    Left ear pain    Gastroesophageal reflux disease, unspecified whether esophagitis present         1. Otitis media, unspecified laterality, unspecified otitis media type    2. Left ear pain    3. Gastroesophageal reflux disease, unspecified whether esophagitis present        1/2.  Recent nasal septum surgery 2-3 weeks ago now presenting with left sided otalgia and otitis media with sinus pressure, sore throat, post nasal drip.  Amoxil sent to pharmacy.  Discussed potential TMJ with noted history and follow up with dentistry.  If persistent/worsening symptoms, follow up with ENT.  3. Chronic, stable.  Continue home meds      RTC with PCP or sooner if needed    Bishop Vanegas MD  [facility]    This note was generated with the assistance of ambient listening technology. Verbal consent was obtained by the patient and accompanying visitor(s) for the recording of patient appointment to facilitate this note. I " attest to having reviewed and edited the generated note for accuracy, though some syntax or spelling errors may persist. Please contact the author of this note for any clarification.
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
## 2025-07-16 DIAGNOSIS — L30.9 ECZEMA, UNSPECIFIED TYPE: ICD-10-CM

## 2025-07-16 DIAGNOSIS — E29.1 HYPOGONADISM MALE: ICD-10-CM

## 2025-07-16 DIAGNOSIS — R79.89 LOW TESTOSTERONE: ICD-10-CM

## 2025-07-16 RX ORDER — TESTOSTERONE CYPIONATE 200 MG/ML
200 INJECTION, SOLUTION INTRAMUSCULAR
Qty: 10 ML | Refills: 3 | OUTPATIENT
Start: 2025-07-16 | End: 2026-04-22

## 2025-07-16 RX ORDER — TRIAMCINOLONE ACETONIDE 1 MG/G
CREAM TOPICAL
Qty: 454 G | Refills: 1 | OUTPATIENT
Start: 2025-07-16

## 2025-07-16 NOTE — TELEPHONE ENCOUNTER
Copied from CRM #[id number]. Topic: Medications - Medication Refill  >> Jul 16, 2025  3:08 PM Marysol wrote:  Type:  RX Refill Request    Who Called: pt  Refill or New Rx:refill   RX Name and Strength: testosterone cypionate (DEPOTESTOTERONE CYPIONATE) 200 mg/mL injection 10 ml & triamcinolone acetonide 0.1% (KENALOG) 0.1 % cream  How is the patient currently taking it? (ex. 1XDay): as prescribed   Is this a 30 day or 90 day RX:10 ml   [facility]erred Pharm[facility] with phone number:[facility] [address] [place], [place] - [address] [facility] [address]  [address] [facility] [address]  Phone: [phone] Fax: [phone]      Local or Mail Order:local   Ordering Provider:  Bentley Mccall MD  Would the patient rather a call back or a response via [facility]? Call back   Best Call Back Number:[phone]    Additional Information: pt is needs [facility] please call back

## 2025-07-16 NOTE — TELEPHONE ENCOUNTER
No care due was identified.  [facility] Embedded Care Due Messages. Reference number: [id number].   7/16/2025 4:02:41 PM CDT

## 2025-07-21 ENCOUNTER — PATIENT MESSAGE (OUTPATIENT)
Dept: UROLOGY | Facility: CLINIC | Age: 45
End: 2025-07-21
Payer: COMMERCIAL

## 2025-07-21 DIAGNOSIS — E29.1 HYPOGONADISM MALE: ICD-10-CM

## 2025-07-21 DIAGNOSIS — R79.89 LOW TESTOSTERONE: ICD-10-CM

## 2025-07-21 DIAGNOSIS — L30.9 ECZEMA, UNSPECIFIED TYPE: ICD-10-CM

## 2025-07-22 RX ORDER — TESTOSTERONE CYPIONATE 200 MG/ML
200 INJECTION, SOLUTION INTRAMUSCULAR
Qty: 10 ML | Refills: 3 | Status: SHIPPED | OUTPATIENT
Start: 2025-07-22 | End: 2025-07-23 | Stop reason: SDUPTHER

## 2025-07-22 RX ORDER — TRIAMCINOLONE ACETONIDE 1 MG/G
CREAM TOPICAL
Qty: 454 G | Refills: 1 | Status: SHIPPED | OUTPATIENT
Start: 2025-07-22

## 2025-07-22 NOTE — TELEPHONE ENCOUNTER
No care due was identified.  [facility] Embedded Care Due Messages. Reference number: [id number].   7/21/2025 8:57:15 PM CDT

## 2025-07-23 RX ORDER — TESTOSTERONE CYPIONATE 200 MG/ML
200 INJECTION, SOLUTION INTRAMUSCULAR
Qty: 10 ML | Refills: 3 | Status: SHIPPED | OUTPATIENT
Start: 2025-07-23 | End: 2026-04-29
